# Patient Record
Sex: MALE | Race: WHITE | Employment: OTHER | URBAN - NONMETROPOLITAN AREA
[De-identification: names, ages, dates, MRNs, and addresses within clinical notes are randomized per-mention and may not be internally consistent; named-entity substitution may affect disease eponyms.]

---

## 2017-10-20 DIAGNOSIS — J44.9 CHRONIC OBSTRUCTIVE PULMONARY DISEASE, UNSPECIFIED COPD TYPE (HCC): ICD-10-CM

## 2017-10-23 RX ORDER — TIOTROPIUM BROMIDE 18 UG/1
CAPSULE ORAL; RESPIRATORY (INHALATION)
Qty: 30 CAPSULE | Refills: 11 | Status: SHIPPED | OUTPATIENT
Start: 2017-10-23

## 2019-04-01 ENCOUNTER — HOSPITAL ENCOUNTER (EMERGENCY)
Age: 54
Discharge: HOME | End: 2019-04-01
Payer: MEDICARE

## 2019-04-01 VITALS
RESPIRATION RATE: 20 BRPM | BODY MASS INDEX: 38.92 KG/M2 | TEMPERATURE: 98.8 F | DIASTOLIC BLOOD PRESSURE: 69 MMHG | WEIGHT: 256 LBS | HEART RATE: 92 BPM | SYSTOLIC BLOOD PRESSURE: 142 MMHG | OXYGEN SATURATION: 96 %

## 2019-04-01 DIAGNOSIS — J20.9 ACUTE BRONCHITIS, UNSPECIFIED ORGANISM: Primary | ICD-10-CM

## 2019-04-01 DIAGNOSIS — Z87.09 HISTORY OF COPD: ICD-10-CM

## 2019-04-01 PROCEDURE — 99214 OFFICE O/P EST MOD 30 MIN: CPT | Performed by: NURSE PRACTITIONER

## 2019-04-01 PROCEDURE — 99212 OFFICE O/P EST SF 10 MIN: CPT

## 2019-04-01 RX ORDER — PREDNISONE 20 MG/1
20 TABLET ORAL 2 TIMES DAILY
Qty: 10 TABLET | Refills: 0 | Status: SHIPPED | OUTPATIENT
Start: 2019-04-01 | End: 2019-04-06

## 2019-04-01 RX ORDER — DEXTROMETHORPHAN POLISTIREX 30 MG/5ML
60 SUSPENSION ORAL 2 TIMES DAILY PRN
Refills: 0 | COMMUNITY
Start: 2019-04-01 | End: 2019-04-11

## 2019-04-01 RX ORDER — LORATADINE 10 MG/1
10 TABLET ORAL DAILY PRN
Refills: 0 | COMMUNITY
Start: 2019-04-01

## 2019-04-01 RX ORDER — DOXYCYCLINE HYCLATE 100 MG/1
100 CAPSULE ORAL 2 TIMES DAILY
Qty: 14 CAPSULE | Refills: 0 | Status: SHIPPED | OUTPATIENT
Start: 2019-04-01 | End: 2019-04-08

## 2019-04-01 ASSESSMENT — ENCOUNTER SYMPTOMS
VOMITING: 0
WHEEZING: 1
CHEST TIGHTNESS: 0
STRIDOR: 0
COUGH: 1
FACIAL SWELLING: 0
SINUS PAIN: 0
EYE DISCHARGE: 0
SHORTNESS OF BREATH: 0
SORE THROAT: 0
SINUS PRESSURE: 0
VOICE CHANGE: 0
TROUBLE SWALLOWING: 0
NAUSEA: 0
SINUS CONGESTION: 1
RHINORRHEA: 1

## 2019-04-01 NOTE — ED PROVIDER NOTES
Harshad   Urgent Care Encounter      [address] [facility]       Chief Complaint   Patient presents with    Cough     congestion x 5 days       Nurses Notes reviewed and I agree except as noted in the HPI. HISTORY OF PRESENT ILLNESS   Gopi Joseph III is a 48 y.o. male who presents:  Currently on Spirivia, Albuterol prn, Pulmicort. Taking Flonase states not helping. History of tobacco dependence, bronchitis. He is on a cpap machine. He has not had to use his albuterol this illness. Denies fever, chills, myalgias. The history is provided by the patient. Cough   Cough characteristics:  Productive  Sputum characteristics:  Yellow and clear (normally has sputum daily, producing more sputum)  Severity:  Moderate  Onset quality:  Sudden  Duration: 3-4 days   Timing:  Constant  Progression:  Unchanged  Chronicity:  Chronic  Smoker: yes    Context: not sick contacts    Relieved by:  Nothing  Worsened by:  Nothing  Associated symptoms: rhinorrhea, sinus congestion and wheezing    Associated symptoms: no chest pain, no chills, no diaphoresis, no ear fullness, no ear pain, no eye discharge, no fever, no headaches, no myalgias, no rash, no shortness of breath, no sore throat and no weight loss    Associated symptoms comment:  Using prescribed inhalers, flonase not helping  Wheezing:     Severity:  Mild    Onset quality:  Sudden    Duration:  4 days    Timing:  Intermittent    Progression:  Unchanged    Chronicity:  Chronic  Risk factors: no recent infection and no recent travel        REVIEW OF SYSTEMS     Review of Systems   Constitutional: Negative for activity change, appetite change, chills, diaphoresis, fatigue, fever, unexpected weight change and weight loss. HENT: Positive for congestion, postnasal drip and rhinorrhea. Negative for ear discharge, ear pain, facial swelling, sinus pressure, sinus pain, sneezing, sore throat, tinnitus, trouble swallowing and voice change.     Eyes: Negative for discharge. Respiratory: Positive for cough and wheezing. Negative for chest tightness, shortness of breath and stridor. Cardiovascular: Negative for chest pain. Gastrointestinal: Negative for nausea and vomiting. Musculoskeletal: Negative for myalgias. Skin: Negative for pallor and rash. Neurological: Negative for dizziness and headaches. Hematological: Negative for adenopathy. PAST MEDICAL HISTORY         Diagnosis Date    Abdominal pain     Acute pharyngitis     Anxiety     Webb's esophagus     Chronic laryngitis     Constipation     COPD (chronic obstructive pulmonary disease) (HCC)     Depression     Fatigue     GERD (gastroesophageal reflux disease)     Hemorrhoids, internal, with bleeding     HEP C W/ COMA, ACUTE/NOS     Hyperlipidemia     Hypertension     IBS (irritable bowel syndrome)     Rectal bleeding     Shortness of breath     Weight loss        SURGICAL HISTORY     Patient  has a past surgical history that includes Cholecystectomy; Mandible fracture surgery (1989); Colonoscopy (02/16/2011); Upper gastrointestinal endoscopy (12/14/2009); Cardiac catheterization (Dec 2012); and Upper gastrointestinal endoscopy (9/25/13). CURRENT MEDICATIONS       Previous Medications    ALBUTEROL (PROVENTIL) (2.5 MG/3ML) 0.083% NEBULIZER SOLUTION    Take 3 mLs by nebulization every 6 hours as needed for Wheezing or Shortness of Breath    ALBUTEROL SULFATE  (90 BASE) MCG/ACT INHALER    Inhale 2 puffs into the lungs every 4 hours    ASPIRIN 81 MG TABLET    Take 81 mg by mouth daily.     ATORVASTATIN (LIPITOR) 40 MG TABLET    Take 1 tablet by mouth daily    BUDESONIDE-FORMOTEROL (SYMBICORT) 160-4.5 MCG/ACT AERO    Inhale 2 puffs into the lungs 2 times daily    DEXILANT 60 MG CPDR DELAYED RELEASE CAPSULE    take 1 capsule by mouth once daily    FLUTICASONE (FLONASE) 50 MCG/ACT NASAL SPRAY    1 spray by Nasal route daily    HANDICAP PLACARD MISC    by Does not jaw. No uvula swelling. Posterior oropharyngeal erythema (mild with post nasal drip) present. No oropharyngeal exudate or posterior oropharyngeal edema. Tonsils are 2+ on the right. Tonsils are 2+ on the left. No tonsillar exudate. Neck: Normal range of motion. Neck supple. Cardiovascular: Normal rate, S1 normal, S2 normal and normal heart sounds. Exam reveals no gallop, no S3, no S4, no distant heart sounds and no friction rub. No murmur heard. Pulmonary/Chest: Effort normal. No stridor. No respiratory distress. He has no decreased breath sounds. He has wheezes (expiratory wheezing LUF, LLL, Right mid and RLL). He has no rhonchi. He has no rales. Lymphadenopathy:     He has no cervical adenopathy. Neurological: He is alert and oriented to person, place, and time. Skin: Skin is warm, dry and intact. No rash noted. He is not diaphoretic. No cyanosis. No pallor. Psychiatric: He has a normal mood and affect. Nursing note and vitals reviewed. DIAGNOSTIC RESULTS   Labs:No results found for this visit on 04/01/19. IMAGING:    URGENT CARE COURSE:     Vitals:    04/01/19 1314   BP: (!) 142/69   Pulse: 92   Resp: 20   Temp: 98.8 °F (37.1 °C)   TempSrc: Temporal   SpO2: 96%   Weight: 256 lb (116.1 kg)       Medications - No data to display  PROCEDURES:  None  FINAL IMPRESSION       1. Acute bronchitis, unspecified organism    2. History of COPD        DISPOSITION/PLAN   DISPOSITION Decision To Discharge 04/01/2019 01:29:19 PM  ADDITIONAL INSTRUCTIONS: Rest, no milk, plenty of clear liquids, tylenol/motrin for fever and pain. PATIENT REFERRED TO:  No follow-up provider specified. Patient instructed to follow up with PCP. If symptoms worsen, become severe or new symptoms develop patient instructedto go to the emergency room immediately.     DISCHARGE MEDICATIONS:  New Prescriptions    DEXTROMETHORPHAN (DELSYM) 30 MG/5ML EXTENDED RELEASE LIQUID    Take 10 mLs by mouth 2 times daily as needed for Cough    DOXYCYCLINE HYCLATE (VIBRAMYCIN) 100 MG CAPSULE    Take 1 capsule by mouth 2 times daily for 7 days    LORATADINE (CLARITIN) 10 MG TABLET    Take 1 tablet by mouth daily as needed (runny nose, sneezing, post nasal drip)    PREDNISONE (DELTASONE) 20 MG TABLET    Take 1 tablet by mouth 2 times daily for 5 days     Current Discharge Medication List          Patient giveneducational materials - see patient instructions. Discussed use, benefit, and side effects of prescribed medications. All patient questions answered. Pt voiced understanding. Reviewed health maintenance. Patient agreedwith treatment plan. Follow up as directed.      ZOHREH Jacob CNP, APRN - CNP  04/01/19 [id number]

## 2019-07-08 ENCOUNTER — HOSPITAL ENCOUNTER (OUTPATIENT)
Age: 54
Setting detail: SPECIMEN
Discharge: HOME | End: 2019-07-08
Payer: MEDICARE

## 2019-07-08 LAB
ABSOLUTE EOS #: 0.09 K/UL (ref 0–0.44)
ABSOLUTE IMMATURE GRANULOCYTE: 0.06 K/UL (ref 0–0.3)
ABSOLUTE LYMPH #: 1.45 K/UL (ref 1.1–3.7)
ABSOLUTE MONO #: 0.55 K/UL (ref 0.1–1.2)
ALBUMIN SERPL-MCNC: 4.1 G/DL (ref 3.5–5.2)
ALBUMIN/GLOBULIN RATIO: 1.6 (ref 1–2.5)
ALP BLD-CCNC: 51 U/L (ref 40–129)
ALT SERPL-CCNC: 21 U/L (ref 5–41)
ANION GAP SERPL CALCULATED.3IONS-SCNC: 13 MMOL/L (ref 9–17)
AST SERPL-CCNC: 20 U/L
BASOPHILS # BLD: 0 % (ref 0–2)
BASOPHILS ABSOLUTE: 0.03 K/UL (ref 0–0.2)
BILIRUB SERPL-MCNC: 0.43 MG/DL (ref 0.3–1.2)
BUN BLDV-MCNC: 10 MG/DL (ref 6–20)
BUN/CREAT BLD: ABNORMAL (ref 9–20)
CALCIUM SERPL-MCNC: 8.7 MG/DL (ref 8.6–10.4)
CHLORIDE BLD-SCNC: 105 MMOL/L (ref 98–107)
CHOLESTEROL/HDL RATIO: 4.6
CHOLESTEROL: 189 MG/DL
CO2: 23 MMOL/L (ref 20–31)
CREAT SERPL-MCNC: 0.94 MG/DL (ref 0.7–1.2)
DIFFERENTIAL TYPE: ABNORMAL
EOSINOPHILS RELATIVE PERCENT: 1 % (ref 1–4)
GFR AFRICAN AMERICAN: >60 ML/MIN
GFR NON-AFRICAN AMERICAN: >60 ML/MIN
GFR SERPL CREATININE-BSD FRML MDRD: ABNORMAL ML/MIN/{1.73_M2}
GFR SERPL CREATININE-BSD FRML MDRD: ABNORMAL ML/MIN/{1.73_M2}
GLUCOSE BLD-MCNC: 107 MG/DL (ref 70–99)
HCT VFR BLD CALC: 43.7 % (ref 40.7–50.3)
HDLC SERPL-MCNC: 41 MG/DL
HEMOGLOBIN: 14.1 G/DL (ref 13–17)
IMMATURE GRANULOCYTES: 1 %
LDL CHOLESTEROL: 116 MG/DL (ref 0–130)
LYMPHOCYTES # BLD: 19 % (ref 24–43)
MCH RBC QN AUTO: 33.3 PG (ref 25.2–33.5)
MCHC RBC AUTO-ENTMCNC: 32.3 G/DL (ref 28.4–34.8)
MCV RBC AUTO: 103.3 FL (ref 82.6–102.9)
MONOCYTES # BLD: 7 % (ref 3–12)
NRBC AUTOMATED: 0 PER 100 WBC
PDW BLD-RTO: 13.6 % (ref 11.8–14.4)
PLATELET # BLD: 195 K/UL (ref 138–453)
PLATELET ESTIMATE: ABNORMAL
PMV BLD AUTO: 10.7 FL (ref 8.1–13.5)
POTASSIUM SERPL-SCNC: 4.2 MMOL/L (ref 3.7–5.3)
RBC # BLD: 4.23 M/UL (ref 4.21–5.77)
RBC # BLD: ABNORMAL 10*6/UL
SEG NEUTROPHILS: 72 % (ref 36–65)
SEGMENTED NEUTROPHILS ABSOLUTE COUNT: 5.35 K/UL (ref 1.5–8.1)
SODIUM BLD-SCNC: 141 MMOL/L (ref 135–144)
TOTAL PROTEIN: 6.7 G/DL (ref 6.4–8.3)
TRIGL SERPL-MCNC: 160 MG/DL
TSH SERPL DL<=0.05 MIU/L-ACNC: 2.36 MIU/L (ref 0.3–5)
VLDLC SERPL CALC-MCNC: ABNORMAL MG/DL (ref 1–30)
WBC # BLD: 7.5 K/UL (ref 3.5–11.3)
WBC # BLD: ABNORMAL 10*3/UL

## 2019-07-09 LAB
HAV IGM SER IA-ACNC: NONREACTIVE
HEPATITIS B CORE IGM ANTIBODY: NONREACTIVE
HEPATITIS B SURFACE ANTIGEN: NONREACTIVE
HEPATITIS C ANTIBODY: REACTIVE

## 2019-07-10 LAB
DIRECT EXAM: NORMAL
Lab: NORMAL
SPECIMEN DESCRIPTION: NORMAL

## 2019-08-21 ENCOUNTER — OFFICE VISIT (OUTPATIENT)
Dept: PULMONOLOGY | Age: 54
End: 2019-08-21
Payer: MEDICARE

## 2019-08-21 VITALS
BODY MASS INDEX: 36.97 KG/M2 | HEIGHT: 69 IN | SYSTOLIC BLOOD PRESSURE: 138 MMHG | WEIGHT: 249.6 LBS | OXYGEN SATURATION: 96 % | HEART RATE: 90 BPM | DIASTOLIC BLOOD PRESSURE: 98 MMHG

## 2019-08-21 DIAGNOSIS — E66.9 OBESITY (BMI 30-39.9): ICD-10-CM

## 2019-08-21 DIAGNOSIS — Z87.891 PERSONAL HISTORY OF SMOKING: ICD-10-CM

## 2019-08-21 DIAGNOSIS — G47.33 OSA (OBSTRUCTIVE SLEEP APNEA): Primary | ICD-10-CM

## 2019-08-21 DIAGNOSIS — J44.9 CHRONIC OBSTRUCTIVE PULMONARY DISEASE, UNSPECIFIED COPD TYPE (HCC): ICD-10-CM

## 2019-08-21 DIAGNOSIS — J44.9 CHRONIC BRONCHITIS WITH COPD (CHRONIC OBSTRUCTIVE PULMONARY DISEASE) (HCC): ICD-10-CM

## 2019-08-21 PROCEDURE — G8926 SPIRO NO PERF OR DOC: HCPCS | Performed by: INTERNAL MEDICINE

## 2019-08-21 PROCEDURE — 1036F TOBACCO NON-USER: CPT | Performed by: INTERNAL MEDICINE

## 2019-08-21 PROCEDURE — 99214 OFFICE O/P EST MOD 30 MIN: CPT | Performed by: INTERNAL MEDICINE

## 2019-08-21 PROCEDURE — G8427 DOCREV CUR MEDS BY ELIG CLIN: HCPCS | Performed by: INTERNAL MEDICINE

## 2019-08-21 PROCEDURE — G8417 CALC BMI ABV UP PARAM F/U: HCPCS | Performed by: INTERNAL MEDICINE

## 2019-08-21 PROCEDURE — 3023F SPIROM DOC REV: CPT | Performed by: INTERNAL MEDICINE

## 2019-08-21 PROCEDURE — 3017F COLORECTAL CA SCREEN DOC REV: CPT | Performed by: INTERNAL MEDICINE

## 2019-10-08 ENCOUNTER — HOSPITAL ENCOUNTER (OUTPATIENT)
Dept: SLEEP CENTER | Age: 54
Discharge: HOME | End: 2019-10-10
Payer: MEDICARE

## 2019-10-08 DIAGNOSIS — G47.33 OSA (OBSTRUCTIVE SLEEP APNEA): ICD-10-CM

## 2019-10-08 PROCEDURE — 95811 POLYSOM 6/>YRS CPAP 4/> PARM: CPT

## 2019-10-09 LAB — STATUS: NORMAL

## 2019-10-16 DIAGNOSIS — G47.33 OSA (OBSTRUCTIVE SLEEP APNEA): Primary | ICD-10-CM

## 2019-10-17 ENCOUNTER — TELEPHONE (OUTPATIENT)
Dept: SLEEP CENTER | Age: 54
End: 2019-10-17

## 2019-11-12 ENCOUNTER — TELEPHONE (OUTPATIENT)
Dept: PULMONOLOGY | Age: 54
End: 2019-11-12

## 2019-11-13 ENCOUNTER — HOSPITAL ENCOUNTER (OUTPATIENT)
Dept: PULMONOLOGY | Age: 54
Discharge: HOME | End: 2019-11-13
Payer: MEDICARE

## 2019-11-13 DIAGNOSIS — J44.9 CHRONIC OBSTRUCTIVE PULMONARY DISEASE, UNSPECIFIED COPD TYPE (HCC): ICD-10-CM

## 2019-11-13 PROCEDURE — 94060 EVALUATION OF WHEEZING: CPT

## 2019-11-20 ENCOUNTER — OFFICE VISIT (OUTPATIENT)
Dept: PULMONOLOGY | Age: 54
End: 2019-11-20
Payer: MEDICARE

## 2019-11-20 VITALS
WEIGHT: 245 LBS | DIASTOLIC BLOOD PRESSURE: 72 MMHG | OXYGEN SATURATION: 98 % | BODY MASS INDEX: 36.29 KG/M2 | HEIGHT: 69 IN | SYSTOLIC BLOOD PRESSURE: 130 MMHG | HEART RATE: 91 BPM

## 2019-11-20 DIAGNOSIS — G47.33 OSA (OBSTRUCTIVE SLEEP APNEA): Primary | ICD-10-CM

## 2019-11-20 DIAGNOSIS — E66.9 OBESITY (BMI 30-39.9): ICD-10-CM

## 2019-11-20 DIAGNOSIS — J44.9 ASTHMA-COPD OVERLAP SYNDROME (HCC): ICD-10-CM

## 2019-11-20 DIAGNOSIS — J44.9 CHRONIC OBSTRUCTIVE PULMONARY DISEASE, UNSPECIFIED COPD TYPE (HCC): ICD-10-CM

## 2019-11-20 PROCEDURE — 99213 OFFICE O/P EST LOW 20 MIN: CPT | Performed by: INTERNAL MEDICINE

## 2019-11-20 PROCEDURE — G8417 CALC BMI ABV UP PARAM F/U: HCPCS | Performed by: INTERNAL MEDICINE

## 2019-11-20 PROCEDURE — G8926 SPIRO NO PERF OR DOC: HCPCS | Performed by: INTERNAL MEDICINE

## 2019-11-20 PROCEDURE — 3023F SPIROM DOC REV: CPT | Performed by: INTERNAL MEDICINE

## 2019-11-20 PROCEDURE — G8427 DOCREV CUR MEDS BY ELIG CLIN: HCPCS | Performed by: INTERNAL MEDICINE

## 2019-11-20 PROCEDURE — 1036F TOBACCO NON-USER: CPT | Performed by: INTERNAL MEDICINE

## 2019-11-20 PROCEDURE — 3017F COLORECTAL CA SCREEN DOC REV: CPT | Performed by: INTERNAL MEDICINE

## 2019-11-20 PROCEDURE — G8484 FLU IMMUNIZE NO ADMIN: HCPCS | Performed by: INTERNAL MEDICINE

## 2019-12-03 ENCOUNTER — TELEPHONE (OUTPATIENT)
Dept: PULMONOLOGY | Age: 54
End: 2019-12-03

## 2020-01-15 ENCOUNTER — TELEPHONE (OUTPATIENT)
Dept: PULMONOLOGY | Age: 55
End: 2020-01-15

## 2020-05-20 ENCOUNTER — OFFICE VISIT (OUTPATIENT)
Dept: PULMONOLOGY | Age: 55
End: 2020-05-20
Payer: MEDICARE

## 2020-05-20 VITALS
TEMPERATURE: 98.2 F | OXYGEN SATURATION: 97 % | WEIGHT: 243 LBS | BODY MASS INDEX: 35.99 KG/M2 | HEART RATE: 86 BPM | DIASTOLIC BLOOD PRESSURE: 76 MMHG | SYSTOLIC BLOOD PRESSURE: 138 MMHG | HEIGHT: 69 IN

## 2020-05-20 PROCEDURE — 3017F COLORECTAL CA SCREEN DOC REV: CPT | Performed by: INTERNAL MEDICINE

## 2020-05-20 PROCEDURE — 1036F TOBACCO NON-USER: CPT | Performed by: INTERNAL MEDICINE

## 2020-05-20 PROCEDURE — G8417 CALC BMI ABV UP PARAM F/U: HCPCS | Performed by: INTERNAL MEDICINE

## 2020-05-20 PROCEDURE — G8926 SPIRO NO PERF OR DOC: HCPCS | Performed by: INTERNAL MEDICINE

## 2020-05-20 PROCEDURE — 3023F SPIROM DOC REV: CPT | Performed by: INTERNAL MEDICINE

## 2020-05-20 PROCEDURE — 99214 OFFICE O/P EST MOD 30 MIN: CPT | Performed by: INTERNAL MEDICINE

## 2020-05-20 PROCEDURE — G8427 DOCREV CUR MEDS BY ELIG CLIN: HCPCS | Performed by: INTERNAL MEDICINE

## 2020-05-20 NOTE — PROGRESS NOTES
Subjective:      CC: COPD    Patient ID: Thee Hernandez is a 47 y.o. male. Quit 2 years ago  Riding his motorcycle    PFTs  Asthma-COPD overlap  FEV1 improved, better than 80%  Doing great, no need for refills   Spiriva, Symbicort, Albuterol  Claims using CPAP via [employer] (sent via mail) but no download, tells me he has follow up next week    No cough, wheezing, uses albuterol 2 to 3 times a day    Does not qualify yet for screening CT due to age, it will next year    Previous notes:  Smoking one cigar every now and then, \"but do not inhale\"  No visits to ED  Needs refill for Spiriva  DAVE with minimal activities ---declines rehab rehab    Tells me he uses Symbicort and Ventolin consistently    FEV1 68%    Review of Systems   Constitutional: Negative for fatigue or unexpected weight change. Negative for fever, chills, diaphoresis and activity change. HENT: Negative for congestion, postnasal drip and sinus pressure. Negative for nosebleeds, facial swelling, rhinorrhea, sneezing, mouth sores, neck stiffness and voice change. Eyes: Negative for photophobia and visual disturbance. Respiratory: Positive for no cough, no wheezing . Negative for apnea and stridor. No hemoptysis   Cardiovascular: Positive for leg swelling. Negative for chest pain and palpitations. Gastrointestinal: Negative for vomiting, abdominal pain, constipation, blood in stool and abdominal distention. Genitourinary: Negative for dysuria, frequency, enuresis and difficulty urinating. Musculoskeletal: Negative for back pain, joint swelling and arthralgias. Objective:   Physical Exam   Nursing note and vitals reviewed. Constitutional: He is oriented to person, place, and time. He appears well-developed. No distress. Obese BMI 35  HENT:   Head: Normocephalic and atraumatic.    Right Ear: External ear normal.   Left Ear: External ear normal.   Mouth/Throat: Oropharynx is clear and moist.   Bilateral nasal nasal air pressure transducer, plus  respiratory effort recorded by calibrated respiratory inductance  plethysmography (RIP), flow volume loop, sound and video. Sleep staging  and scoring followed the standard put forth by the American Academy of  Sleep Medicine and utilized the 4A obstructive hypopnea event  desaturation of 4 percent or greater.     DETAILS OF THE STUDY:  There are two portions to this test, diagnostic  portion and therapeutic portion.     DIAGNOSTIC PORTION:  Lights out 09:38 p.m., lights on 12:35 a.m., total  recording time 175 minutes, sleep period time 161 minutes. Total sleep  time 151 minutes. Sleep efficiency 85.3%. Latency to sleep 60 minutes. Wake after sleep onset 10 minutes. Latency to REM 95.5 minutes.     SLEEP STAGING:  The patient slept 11 minutes or 7.3% of total sleep time  in N1 sleep, 110 minutes or 73.2% in N2 sleep, 14.5 minutes or 9.6% in  N3 sleep, 15 minutes or 9.9% in REM sleep.     RESPIRATORY SUMMARY:  80 obstructive apneas, 23 obstructive hypopneas  for an AHI of 40.9. The REM AHI was 32 and the supine AHI was 0.     BODY POSITION SUMMARY:  There were 102 minutes of prone recording, 22  minutes on the left and 26 minutes on the right side.     SLEEP DISTURBANCE SUMMARY:  There were 105 arousals for an arousal index  of 41.7.  98 of these were related to respiratory events with a  respiratory arousal index of 38.9.     PERIODIC LIMB MOVEMENT SUMMARY:  There were none.     PULSE OXIMETRY SUMMARY:  Mean oxygen saturation during wakefulness 94%,  during sleep 92.5%. Lowest oxygen saturation 85%. There were three  minutes of oxygen saturation below 88%.     ECG SUMMARY:  The patient remained in normal sinus rhythm through the  night.     THERAPEUTIC PORTION:  Lights out 12:38 a.m., lights on 05:05 a.m., total  recording time 266 minutes, sleep period time 262 minutes. Total sleep  time 243 minutes, sleep efficiency 91.5%.   Latency to sleep 3.5 minutes,  wake after 35  -Chronic bronchitis      Plan:         -Sleep clinic next week with D/L  -Continue Albuterol MDI prn/Symbicort/Spiriva  -Will qualify for screening CT chest next year  -Needs to lose wt  -Follow up in 6 mos with nino

## 2020-06-03 ENCOUNTER — OFFICE VISIT (OUTPATIENT)
Dept: PULMONOLOGY | Age: 55
End: 2020-06-03
Payer: MEDICARE

## 2020-06-03 ENCOUNTER — TELEPHONE (OUTPATIENT)
Dept: PULMONOLOGY | Age: 55
End: 2020-06-03

## 2020-06-03 VITALS
OXYGEN SATURATION: 99 % | WEIGHT: 246 LBS | DIASTOLIC BLOOD PRESSURE: 80 MMHG | SYSTOLIC BLOOD PRESSURE: 138 MMHG | HEART RATE: 80 BPM | TEMPERATURE: 97.9 F | HEIGHT: 69 IN | BODY MASS INDEX: 36.43 KG/M2

## 2020-06-03 PROCEDURE — 3017F COLORECTAL CA SCREEN DOC REV: CPT | Performed by: PHYSICIAN ASSISTANT

## 2020-06-03 PROCEDURE — 3023F SPIROM DOC REV: CPT | Performed by: PHYSICIAN ASSISTANT

## 2020-06-03 PROCEDURE — 1036F TOBACCO NON-USER: CPT | Performed by: PHYSICIAN ASSISTANT

## 2020-06-03 PROCEDURE — G8417 CALC BMI ABV UP PARAM F/U: HCPCS | Performed by: PHYSICIAN ASSISTANT

## 2020-06-03 PROCEDURE — G8926 SPIRO NO PERF OR DOC: HCPCS | Performed by: PHYSICIAN ASSISTANT

## 2020-06-03 PROCEDURE — 99214 OFFICE O/P EST MOD 30 MIN: CPT | Performed by: PHYSICIAN ASSISTANT

## 2020-06-03 PROCEDURE — G8427 DOCREV CUR MEDS BY ELIG CLIN: HCPCS | Performed by: PHYSICIAN ASSISTANT

## 2020-06-03 ASSESSMENT — ENCOUNTER SYMPTOMS
WHEEZING: 0
NAUSEA: 0
CHEST TIGHTNESS: 0
EYES NEGATIVE: 1
BACK PAIN: 0
COUGH: 0
SHORTNESS OF BREATH: 1
STRIDOR: 0
ALLERGIC/IMMUNOLOGIC NEGATIVE: 1
DIARRHEA: 0

## 2020-06-03 NOTE — PROGRESS NOTES
[facility]      Arcadio Hallman         [id number]  6/3/2020   Chief Complaint   Patient presents with    Follow-up     DARON 6-8wk f/u        Pt of Dr. Sami Arvizu    PAP Download:   Original or initial AHI: 53.9     Date of initial study: 2/27/13      Compliant  33.3%     Noncompliant 66.7 %     PAP Type dream station Level  [id number]   Avg Hrs/Day 3hrs 5 min  AHI: 1.7   Recorded compliance dates ,5/3/20-6/1/20  Machine/Mfg: French  Interface: ffm    Provider:    __[facility]           Tinoco Louis        _x_ Mendel Craw    __ Kristin Carlos            __[facility] __Adaptive   __[place]:       __Other    Neck Size: 19.5  Mallampati Mallampati 4  ESS:  16      Here is a scan of the most recent download:          Presentation:   Sachin Rodriguez presents for sleep medicine follow up for obstructive sleep apnea  Since the last visit, Sachin Rodriguez is struggling with getting use to PAP. He takes it off at times through the night at times. He is still tired at times. Mask is leaking. DME re-fit mask today and gave smaller cushion  Equipment issues: The pressure is  acceptable, the mask is acceptable     Sleep issues:  Do you feel better? No  More rested? No   Better concentration? no    Progress History:   Since last visit any new medical issues? No  New ER or hospitlal visits? No  Any new or changes in medicines? No  Any new sleep medicines?  No        Past Medical History:   Diagnosis Date    Abdominal pain     Acute pharyngitis     Anxiety     Webb's esophagus     Chronic laryngitis     Constipation     COPD (chronic obstructive pulmonary disease) (HCC)     Depression     Fatigue     GERD (gastroesophageal reflux disease)     Hemorrhoids, internal, with bleeding     HEP C W/ COMA, ACUTE/NOS     Hyperlipidemia     Hypertension     IBS (irritable bowel syndrome)     Rectal bleeding     Shortness of breath     Weight loss        Past Surgical History:   Procedure Laterality Date    CARDIAC equal, round, and reactive to light. Neck:      Musculoskeletal: Normal range of motion and neck supple. Cardiovascular:      Rate and Rhythm: Normal rate and regular rhythm. Heart sounds: Normal heart sounds. Pulmonary:      Effort: Pulmonary effort is normal.      Breath sounds: Normal breath sounds. Musculoskeletal: Normal range of motion. Skin:     General: Skin is warm and dry. Neurological:      Mental Status: He is alert and oriented to person, place, and time. Psychiatric:         Behavior: Behavior normal.         Thought Content: Thought content normal.         Judgment: Judgment normal.           ASSESSMENT/DIAGNOSIS     Diagnosis Orders   1. DARON on CPAP     2. Obesity (BMI 30-39.9)     3. Asthma-COPD overlap syndrome ([facility] [address]     4. Hypersomnia              Plan   Do you need any equipment today? No  - Will see if new cushion improves compliance  - Discussed the importance of compliance. - Will adjust ramp time to improve compliance  - He  was advised to continue current positive airway pressure therapy with above described pressure. - He  advised to keep good compliance with current recommended pressure to get optimal results and clinical improvement  - Recommend 7-9 hours of sleep with PAP  - He was advised to call [employer] regarding supplies if needed.   -He call my office for earlier appointment if needed for worsening of sleep symptoms.   - He was instructed on weight loss  - Fátima Pearl was educated about my impression and plan. Patient verbalizesunderstanding.   We will see Juancho Domínguez III back in: 3 months with download    Information added by my medical assistant/LPN was reviewed today          Andrade Mccabe PA-C, MPAS  6/3/2020

## 2020-06-03 NOTE — TELEPHONE ENCOUNTER
Jorge Steven called back he was seen today by trung he received a rx for CPAP machine [facility] but was sent to [facility] medical equipment, his machine is from [employer], please send new rx to [facility].

## 2020-09-09 ENCOUNTER — OFFICE VISIT (OUTPATIENT)
Dept: PULMONOLOGY | Age: 55
End: 2020-09-09
Payer: MEDICARE

## 2020-09-09 ENCOUNTER — TELEPHONE (OUTPATIENT)
Dept: PULMONOLOGY | Age: 55
End: 2020-09-09

## 2020-09-09 VITALS
SYSTOLIC BLOOD PRESSURE: 120 MMHG | RESPIRATION RATE: 20 BRPM | WEIGHT: 240 LBS | TEMPERATURE: 98.3 F | DIASTOLIC BLOOD PRESSURE: 70 MMHG | OXYGEN SATURATION: 97 % | BODY MASS INDEX: 35.55 KG/M2 | HEIGHT: 69 IN | HEART RATE: 84 BPM

## 2020-09-09 PROCEDURE — 3023F SPIROM DOC REV: CPT | Performed by: PHYSICIAN ASSISTANT

## 2020-09-09 PROCEDURE — 1036F TOBACCO NON-USER: CPT | Performed by: PHYSICIAN ASSISTANT

## 2020-09-09 PROCEDURE — 99214 OFFICE O/P EST MOD 30 MIN: CPT | Performed by: PHYSICIAN ASSISTANT

## 2020-09-09 PROCEDURE — 3017F COLORECTAL CA SCREEN DOC REV: CPT | Performed by: PHYSICIAN ASSISTANT

## 2020-09-09 PROCEDURE — G8417 CALC BMI ABV UP PARAM F/U: HCPCS | Performed by: PHYSICIAN ASSISTANT

## 2020-09-09 PROCEDURE — G8926 SPIRO NO PERF OR DOC: HCPCS | Performed by: PHYSICIAN ASSISTANT

## 2020-09-09 PROCEDURE — G8427 DOCREV CUR MEDS BY ELIG CLIN: HCPCS | Performed by: PHYSICIAN ASSISTANT

## 2020-09-09 RX ORDER — ROPINIROLE 0.25 MG/1
TABLET, FILM COATED ORAL
Qty: 60 TABLET | Refills: 3 | Status: SHIPPED | OUTPATIENT
Start: 2020-09-09 | End: 2021-01-05 | Stop reason: ALTCHOICE

## 2020-09-09 ASSESSMENT — ENCOUNTER SYMPTOMS
COUGH: 1
ALLERGIC/IMMUNOLOGIC NEGATIVE: 1
DIARRHEA: 0
WHEEZING: 0
STRIDOR: 0
SHORTNESS OF BREATH: 1
CHEST TIGHTNESS: 0
NAUSEA: 0
BACK PAIN: 0
EYES NEGATIVE: 1

## 2020-09-09 NOTE — PROGRESS NOTES
[facility]      Nael Taveras         [id number]  9/9/2020   Chief Complaint   Patient presents with    Follow-up     DARON 3 mo f/u download        Pt of Dr. Vikram MARCH Download:   Original or initial AHI: 53.9   Date of initial study: 2-27-13      Compliant  63.3%     Noncompliant 36.7 %     PAP Type C PAP Level  11 cmH2O   Avg Hrs/Day 6 hrs 39 min 39 sec  AHI: 0.9   Recorded compliance dates , 8-5-20 to 9-3-20   Machine/Mfg:   [] [employer]Med    [x] Respironics/Dreamstation   Interface:   [] Nasal    [] Nasal pillows   [x] FFM      Provider:      [] [facility]     []Tito     [x] [employer]    [] [address]    [] [place]               [] [facility]      [] [employer]    [] [address]      [] Other    Neck Size: 19.5 in   Mallampati IV  ESS:  13    Here is a scan of the most recent download:        Presentation:   Jt Herrera presents for sleep medicine follow up for obstructive sleep apnea  Since the last visit, Jt Herrera is doing better with PAP. He is still very restless at night,  He feels like he has RLS. He has not been treated for it. He states he tosses and turns a lot at night. He is tired during the day. Equipment issues: The pressure is  acceptable, the mask is acceptable     Sleep issues:  Do you feel better? No  More rested? No  Better concentration? no    Progress History:   Since last visit any new medical issues? No  New ER or hospitlal visits? No  Any new or changes in medicines? No  Any new sleep medicines?  No        Past Medical History:   Diagnosis Date    Abdominal pain     Acute pharyngitis     Anxiety     Webb's esophagus     Chronic laryngitis     Constipation     COPD (chronic obstructive pulmonary disease) (HCC)     Depression     Fatigue     GERD (gastroesophageal reflux disease)     Hemorrhoids, internal, with bleeding     HEP C W/ COMA, ACUTE/NOS     Hyperlipidemia     Hypertension     IBS (irritable bowel syndrome)     Rectal bleeding  Shortness of breath     Weight loss        Past Surgical History:   Procedure Laterality Date    CARDIAC CATHETERIZATION  Dec 2012    CHOLECYSTECTOMY      COLONOSCOPY  2011    MANDIBLE FRACTURE SURGERY  1989    UPPER GASTROINTESTINAL ENDOSCOPY  2009    UPPER GASTROINTESTINAL ENDOSCOPY  13    sanchez's esophagus repeat 3 yr       Social History     Tobacco Use    Smoking status: Former Smoker     Packs/day: 1.00     Years: 30.00     Pack years: 30.00     Types: Cigarettes     Start date: 1979     Last attempt to quit: 2014     Years since quittin.6    Smokeless tobacco: Never Used    Tobacco comment: reports occasional cigarette but not daily   Substance Use Topics    Alcohol use: No     Comment: Quit    Drug use: No       No Known Allergies    Current Outpatient Medications   Medication Sig Dispense Refill    CPAP Machine MISC by Does not apply route Please change ramp to starting pressure 6 and 10 min ramp time 1 each 0    loratadine (CLARITIN) 10 MG tablet Take 1 tablet by mouth daily as needed (runny nose, sneezing, post nasal drip)  0    SPIRIVA HANDIHALER 18 MCG inhalation capsule inhale the contents of one capsule in the handihaler once daily 30 capsule 11    Handicap Placard MISC by Does not apply route Duration 5 years 1 each 0    DEXILANT 60 MG CPDR delayed release capsule take 1 capsule by mouth once daily 30 capsule 5    budesonide-formoterol (SYMBICORT) 160-4.5 MCG/ACT AERO Inhale 2 puffs into the lungs 2 times daily 1 Inhaler 11    albuterol sulfate  (90 BASE) MCG/ACT inhaler Inhale 2 puffs into the lungs every 4 hours 1 Inhaler 11    atorvastatin (LIPITOR) 40 MG tablet Take 1 tablet by mouth daily 30 tablet 5    Handicap Placard MISC by Does not apply route Duration 5 years 1 each 0    nystatin (MYCOSTATIN) 559123 UNIT/ML suspension Take 5 mLs by mouth 4 times daily as needed (thrush) 120 mL 0    fluticasone (FLONASE) 50 MCG/ACT nasal spray 1 spray by Nasal route daily 3 Bottle 11    albuterol (PROVENTIL) (2.5 MG/3ML) 0.083% nebulizer solution Take 3 mLs by nebulization every 6 hours as needed for Wheezing or Shortness of Breath 1 Package 5    Respiratory Therapy Supplies (NEBULIZER COMPRESSOR) KIT by Does not apply route. 1 kit 0    aspirin 81 MG tablet Take 81 mg by mouth daily. No current facility-administered medications for this visit. Family History   Problem Relation Age of Onset    Heart Failure Mother     High Blood Pressure Mother     Heart Disease Mother     Diabetes Mother         had type 1 then it got better    Heart Surgery Father         CABG    Heart Disease Father     Other Brother         Review of Systems -   Review of Systems   Constitutional: Negative for activity change, appetite change, chills and fever. HENT: Negative for congestion and postnasal drip. Eyes: Negative. Respiratory: Positive for cough and shortness of breath. Negative for chest tightness, wheezing and stridor. Cardiovascular: Negative for chest pain and leg swelling. Gastrointestinal: Negative for diarrhea and nausea. Endocrine: Negative. Genitourinary: Negative. Musculoskeletal: Negative. Negative for arthralgias and back pain. Skin: Negative. Allergic/Immunologic: Negative. Neurological: Negative. Negative for dizziness and light-headedness. Psychiatric/Behavioral: Negative. All other systems reviewed and are negative. Physical Exam:    BMI:  Body mass index is 35.44 kg/m².     Wt Readings from Last 3 Encounters:   09/09/20 240 lb (108.9 kg)   06/03/20 246 lb (111.6 kg)   05/20/20 243 lb (110.2 kg)     Weight stable / unchanged  Vitals: /70 (Site: Left Upper Arm, Position: Sitting, Cuff Size: Medium Adult)   Pulse 84   Temp 98.3 °F (36.8 °C) Comment: Temporal  Resp 20   Ht 5' 9\" (1.753 m)   Wt 240 lb (108.9 kg)   SpO2 97% Comment: on RA  BMI 35.44 kg/m²       Physical Exam  Constitutional:       Appearance: He is well-developed. HENT:      Head: Normocephalic and atraumatic. Right Ear: External ear normal.      Left Ear: External ear normal.   Eyes:      Conjunctiva/sclera: Conjunctivae normal.      Pupils: Pupils are equal, round, and reactive to light. Neck:      Musculoskeletal: Normal range of motion and neck supple. Cardiovascular:      Rate and Rhythm: Normal rate and regular rhythm. Heart sounds: Normal heart sounds. Pulmonary:      Effort: Pulmonary effort is normal.      Breath sounds: Normal breath sounds. Musculoskeletal: Normal range of motion. Skin:     General: Skin is warm and dry. Neurological:      Mental Status: He is alert and oriented to person, place, and time. Psychiatric:         Behavior: Behavior normal.         Thought Content: Thought content normal.         Judgment: Judgment normal.           ASSESSMENT/DIAGNOSIS     Diagnosis Orders   1. DARON on CPAP     2. Obesity (BMI 30-39.9)     3. Asthma-COPD overlap syndrome ([address]     4. Hypersomnia     5. RLS (restless legs syndrome)              Plan   Do you need any equipment today? No  - Will try treating for RLS to see if improves sleep quality and hypersomnia  - He  was advised to continue current positive airway pressure therapy with above described pressure. - He  advised to keep good compliance with current recommended pressure to get optimal results and clinical improvement  - Recommend 7-9 hours of sleep with PAP  - He was advised to call [employer] regarding supplies if needed.   -He call my office for earlier appointment if needed for worsening of sleep symptoms.   - He was instructed on weight loss  - Clif Patiño was educated about my impression and plan. Patient verbalizesunderstanding.   We will see Collin Benjamin III back in: 3 months with download    Information added by my medical assistant/LPN was reviewed today          Shun Henry PA-C, CATALINOS  9/9/2020

## 2021-01-05 ENCOUNTER — OFFICE VISIT (OUTPATIENT)
Dept: PULMONOLOGY | Age: 56
End: 2021-01-05
Payer: MEDICARE

## 2021-01-05 VITALS
DIASTOLIC BLOOD PRESSURE: 82 MMHG | WEIGHT: 245 LBS | TEMPERATURE: 98.2 F | BODY MASS INDEX: 36.29 KG/M2 | SYSTOLIC BLOOD PRESSURE: 130 MMHG | HEART RATE: 82 BPM | OXYGEN SATURATION: 98 % | HEIGHT: 69 IN

## 2021-01-05 DIAGNOSIS — Z99.89 OSA ON CPAP: Primary | ICD-10-CM

## 2021-01-05 DIAGNOSIS — G25.81 RLS (RESTLESS LEGS SYNDROME): ICD-10-CM

## 2021-01-05 DIAGNOSIS — E66.9 OBESITY (BMI 30-39.9): ICD-10-CM

## 2021-01-05 DIAGNOSIS — G47.33 OSA ON CPAP: Primary | ICD-10-CM

## 2021-01-05 DIAGNOSIS — G47.10 HYPERSOMNIA: ICD-10-CM

## 2021-01-05 PROCEDURE — 1036F TOBACCO NON-USER: CPT | Performed by: PHYSICIAN ASSISTANT

## 2021-01-05 PROCEDURE — 99214 OFFICE O/P EST MOD 30 MIN: CPT | Performed by: PHYSICIAN ASSISTANT

## 2021-01-05 PROCEDURE — G8484 FLU IMMUNIZE NO ADMIN: HCPCS | Performed by: PHYSICIAN ASSISTANT

## 2021-01-05 PROCEDURE — G8427 DOCREV CUR MEDS BY ELIG CLIN: HCPCS | Performed by: PHYSICIAN ASSISTANT

## 2021-01-05 PROCEDURE — G8417 CALC BMI ABV UP PARAM F/U: HCPCS | Performed by: PHYSICIAN ASSISTANT

## 2021-01-05 PROCEDURE — 3017F COLORECTAL CA SCREEN DOC REV: CPT | Performed by: PHYSICIAN ASSISTANT

## 2021-01-05 RX ORDER — GABAPENTIN 100 MG/1
100 CAPSULE ORAL NIGHTLY
Qty: 30 CAPSULE | Refills: 5 | Status: SHIPPED | OUTPATIENT
Start: 2021-01-05 | End: 2021-04-06 | Stop reason: SDUPTHER

## 2021-01-05 ASSESSMENT — ENCOUNTER SYMPTOMS
SHORTNESS OF BREATH: 0
ALLERGIC/IMMUNOLOGIC NEGATIVE: 1
CHEST TIGHTNESS: 0
WHEEZING: 0
COUGH: 0
EYES NEGATIVE: 1
BACK PAIN: 0
STRIDOR: 0
DIARRHEA: 0
NAUSEA: 0

## 2021-01-05 NOTE — PROGRESS NOTES
 Shortness of breath     Weight loss        Past Surgical History:   Procedure Laterality Date    CARDIAC CATHETERIZATION  Dec 2012    CHOLECYSTECTOMY      COLONOSCOPY  2011    MANDIBLE FRACTURE SURGERY  1989    UPPER GASTROINTESTINAL ENDOSCOPY  2009    UPPER GASTROINTESTINAL ENDOSCOPY  13    sanchez's esophagus repeat 3 yr       Social History     Tobacco Use    Smoking status: Former Smoker     Packs/day: 1.00     Years: 30.00     Pack years: 30.00     Types: Cigarettes     Start date: 1979     Quit date: 2014     Years since quittin.0    Smokeless tobacco: Never Used    Tobacco comment: reports occasional cigarette but not daily   Substance Use Topics    Alcohol use: No     Comment: Quit    Drug use: No       No Known Allergies    Current Outpatient Medications   Medication Sig Dispense Refill    CPAP Machine MISC by Does not apply route Please change ramp to starting pressure 6 and 10 min ramp time 1 each 0    loratadine (CLARITIN) 10 MG tablet Take 1 tablet by mouth daily as needed (runny nose, sneezing, post nasal drip)  0    SPIRIVA HANDIHALER 18 MCG inhalation capsule inhale the contents of one capsule in the handihaler once daily 30 capsule 11    Handicap Placard MISC by Does not apply route Duration 5 years 1 each 0    DEXILANT 60 MG CPDR delayed release capsule take 1 capsule by mouth once daily 30 capsule 5    budesonide-formoterol (SYMBICORT) 160-4.5 MCG/ACT AERO Inhale 2 puffs into the lungs 2 times daily 1 Inhaler 11    albuterol sulfate  (90 BASE) MCG/ACT inhaler Inhale 2 puffs into the lungs every 4 hours 1 Inhaler 11    atorvastatin (LIPITOR) 40 MG tablet Take 1 tablet by mouth daily 30 tablet 5    Handicap Placard MISC by Does not apply route Duration 5 years 1 each 0    nystatin (MYCOSTATIN) 842330 UNIT/ML suspension Take 5 mLs by mouth 4 times daily as needed (thrush) 120 mL 0    fluticasone (FLONASE) 50 MCG/ACT nasal spray 1 spray by Nasal route daily 3 Bottle 11    albuterol (PROVENTIL) (2.5 MG/3ML) 0.083% nebulizer solution Take 3 mLs by nebulization every 6 hours as needed for Wheezing or Shortness of Breath 1 Package 5    Respiratory Therapy Supplies (NEBULIZER COMPRESSOR) KIT by Does not apply route. 1 kit 0    aspirin 81 MG tablet Take 81 mg by mouth daily. No current facility-administered medications for this visit. Family History   Problem Relation Age of Onset    Heart Failure Mother     High Blood Pressure Mother     Heart Disease Mother     Diabetes Mother         had type 1 then it got better    Heart Surgery Father         CABG    Heart Disease Father     Other Brother         Review of Systems -   Review of Systems   Constitutional: Negative for activity change, appetite change, chills and fever. HENT: Negative for congestion and postnasal drip. Eyes: Negative. Respiratory: Negative for cough, chest tightness, shortness of breath, wheezing and stridor. Cardiovascular: Negative for chest pain and leg swelling. Gastrointestinal: Negative for diarrhea and nausea. Endocrine: Negative. Genitourinary: Negative. Musculoskeletal: Negative. Negative for arthralgias and back pain. Skin: Negative. Allergic/Immunologic: Negative. Neurological: Negative for dizziness and light-headedness. Neuropathy    Psychiatric/Behavioral: Negative. All other systems reviewed and are negative. Physical Exam:    BMI:  Body mass index is 36.18 kg/m².     Wt Readings from Last 3 Encounters:   01/05/21 245 lb (111.1 kg)   09/09/20 240 lb (108.9 kg)   06/03/20 246 lb (111.6 kg)     Weight stable / unchanged  Vitals: /82 (Site: Left Upper Arm, Position: Sitting, Cuff Size: Medium Adult)   Pulse 82   Temp 98.2 °F (36.8 °C)   Ht 5' 9\" (1.753 m)   Wt 245 lb (111.1 kg)   SpO2 98% Comment: on room air  BMI 36.18 kg/m²       Physical Exam  Constitutional:       Appearance: He is well-developed. HENT:      Head: Normocephalic and atraumatic. Right Ear: External ear normal.      Left Ear: External ear normal.   Eyes:      Conjunctiva/sclera: Conjunctivae normal.      Pupils: Pupils are equal, round, and reactive to light. Neck:      Musculoskeletal: Normal range of motion and neck supple. Cardiovascular:      Rate and Rhythm: Normal rate and regular rhythm. Heart sounds: Normal heart sounds. Pulmonary:      Effort: Pulmonary effort is normal.      Breath sounds: Normal breath sounds. Musculoskeletal: Normal range of motion. Skin:     General: Skin is warm and dry. Neurological:      Mental Status: He is alert and oriented to person, place, and time. Psychiatric:         Behavior: Behavior normal.         Thought Content: Thought content normal.         Judgment: Judgment normal.           ASSESSMENT/DIAGNOSIS     Diagnosis Orders   1. DARON on CPAP     2. Obesity (BMI 30-39.9)     3. Hypersomnia     4. RLS (restless legs syndrome)              Plan   Do you need any equipment today? No  - Will stop Requip and try gabapentin. - Download reviewed and discussed with patient  - He  was advised to continue current positive airway pressure therapy with above described pressure. - He  advised to keep good compliance with current recommended pressure to get optimal results and clinical improvement  - Recommend 7-9 hours of sleep with PAP  - He was advised to call [employer] regarding supplies if needed.   -He call my office for earlier appointment if needed for worsening of sleep symptoms.   - He was instructed on weight loss  - Madelaine Vale was educated about my impression and plan. Patient verbalizesunderstanding.   We will see Gold Escobedo III back in: 3 months with download    Information added by my medical assistant/LPN was reviewed today         Nitin Adams PA-C, MPAS  1/5/2021       Total time for visit was 30 min

## 2021-01-08 RX ORDER — ROPINIROLE 0.25 MG/1
TABLET, FILM COATED ORAL
Qty: 60 TABLET | Refills: 3 | Status: SHIPPED | OUTPATIENT
Start: 2021-01-08 | End: 2021-04-06

## 2021-04-06 ENCOUNTER — OFFICE VISIT (OUTPATIENT)
Dept: PULMONOLOGY | Age: 56
End: 2021-04-06
Payer: MEDICARE

## 2021-04-06 VITALS
HEIGHT: 69 IN | SYSTOLIC BLOOD PRESSURE: 134 MMHG | OXYGEN SATURATION: 97 % | WEIGHT: 241 LBS | HEART RATE: 87 BPM | TEMPERATURE: 98.6 F | DIASTOLIC BLOOD PRESSURE: 86 MMHG | BODY MASS INDEX: 35.7 KG/M2

## 2021-04-06 DIAGNOSIS — E66.9 OBESITY (BMI 30-39.9): ICD-10-CM

## 2021-04-06 DIAGNOSIS — G47.33 OSA ON CPAP: Primary | ICD-10-CM

## 2021-04-06 DIAGNOSIS — G25.81 RLS (RESTLESS LEGS SYNDROME): ICD-10-CM

## 2021-04-06 DIAGNOSIS — Z99.89 OSA ON CPAP: Primary | ICD-10-CM

## 2021-04-06 DIAGNOSIS — G47.10 HYPERSOMNIA: ICD-10-CM

## 2021-04-06 PROCEDURE — 99214 OFFICE O/P EST MOD 30 MIN: CPT | Performed by: PHYSICIAN ASSISTANT

## 2021-04-06 PROCEDURE — 3017F COLORECTAL CA SCREEN DOC REV: CPT | Performed by: PHYSICIAN ASSISTANT

## 2021-04-06 PROCEDURE — 1036F TOBACCO NON-USER: CPT | Performed by: PHYSICIAN ASSISTANT

## 2021-04-06 PROCEDURE — G8427 DOCREV CUR MEDS BY ELIG CLIN: HCPCS | Performed by: PHYSICIAN ASSISTANT

## 2021-04-06 PROCEDURE — G8417 CALC BMI ABV UP PARAM F/U: HCPCS | Performed by: PHYSICIAN ASSISTANT

## 2021-04-06 RX ORDER — GABAPENTIN 300 MG/1
300 CAPSULE ORAL NIGHTLY
Qty: 30 CAPSULE | Refills: 2 | Status: SHIPPED | OUTPATIENT
Start: 2021-04-06 | End: 2021-06-10 | Stop reason: SDUPTHER

## 2021-04-06 ASSESSMENT — ENCOUNTER SYMPTOMS
EYES NEGATIVE: 1
CHEST TIGHTNESS: 0
NAUSEA: 0
WHEEZING: 0
COUGH: 0
BACK PAIN: 0
ALLERGIC/IMMUNOLOGIC NEGATIVE: 1
STRIDOR: 0
SHORTNESS OF BREATH: 0
DIARRHEA: 0

## 2021-04-06 NOTE — PROGRESS NOTES
pain and leg swelling. Gastrointestinal: Negative for diarrhea and nausea. Endocrine: Negative. Genitourinary: Negative. Musculoskeletal: Negative. Negative for arthralgias and back pain. Skin: Negative. Allergic/Immunologic: Negative. Neurological: Negative. Negative for dizziness and light-headedness. Psychiatric/Behavioral: Negative. All other systems reviewed and are negative. Physical Exam:    BMI:  Body mass index is 35.59 kg/m². Wt Readings from Last 3 Encounters:   04/06/21 241 lb (109.3 kg)   01/05/21 245 lb (111.1 kg)   09/09/20 240 lb (108.9 kg)     Weight stable / unchanged  Vitals: /86 (Site: Right Upper Arm, Position: Sitting, Cuff Size: Large Adult)   Pulse 87   Temp 98.6 °F (37 °C)   Ht 5' 9\" (1.753 m)   Wt 241 lb (109.3 kg)   SpO2 97% Comment: on room air at rest  BMI 35.59 kg/m²       Physical Exam  Constitutional:       Appearance: He is well-developed. HENT:      Head: Normocephalic and atraumatic. Right Ear: External ear normal.      Left Ear: External ear normal.   Eyes:      Conjunctiva/sclera: Conjunctivae normal.      Pupils: Pupils are equal, round, and reactive to light. Neck:      Musculoskeletal: Normal range of motion and neck supple. Cardiovascular:      Rate and Rhythm: Normal rate and regular rhythm. Heart sounds: Normal heart sounds. Pulmonary:      Effort: Pulmonary effort is normal.      Breath sounds: Normal breath sounds. Musculoskeletal: Normal range of motion. Skin:     General: Skin is warm and dry. Neurological:      Mental Status: He is alert and oriented to person, place, and time. Psychiatric:         Behavior: Behavior normal.         Thought Content: Thought content normal.         Judgment: Judgment normal.           ASSESSMENT/DIAGNOSIS     Diagnosis Orders   1. DARON on CPAP     2. Obesity (BMI 30-39.9)     3. Hypersomnia     4.  RLS (restless legs syndrome)              Plan   Do you need any equipment today? No  - He describes more neuropathy discomfort than RLS  - Will increase gabapentin and refer to PCP  - Download reviewed and discussed with patient  - He  was advised to continue current positive airway pressure therapy with above described pressure. - He  advised to keep good compliance with current recommended pressure to get optimal results and clinical improvement  - Recommend 7-9 hours of sleep with PAP  - He was advised to call [employer] regarding supplies if needed.   -He call my office for earlier appointment if needed for worsening of sleep symptoms.   - He was instructed on weight loss  - CASEY was educated about my impression and plan. Patient verbalizesunderstanding.   We will see Pedro Ammy DE LEÓN back in: 2 months with download    Information added by my medical assistant/LPN was reviewed today       Prudence Cough NICOLE, DANYEL  4/6/2021

## 2021-06-09 NOTE — PROGRESS NOTES
[facility]      Willie Gan         [id number]  6/10/2021   Chief Complaint   Patient presents with    Follow-up     DARON 2 month sleep follow up with download         Pt of Dr. Levora Aase    PAP Download:   Original or initial AHI: 53.9     Date of initial study: 5/27/2013      Compliant  66.7%     Noncompliant n/a %     PAP Type Cpap ProLevel  11   Avg Hrs/Day 6 hr 38 min 53 sec  AHI: 2.1   Recorded compliance dates , 5/10/2021  to 6/8/2021   Machine/Mfg:   [] ResMed    [x] Respironics/Dreamstation   Interface:   [] Nasal    [] Nasal pillows   [x] FFM      Provider:      [] SR-HME     [][employer]     [x] [employer]    [] Normal    [] [employer]               [] [employer]      [] Adaptive    [] [address]      [] Other    Neck Size: 19.5  Mallampati Mallampati 4  ESS:  13  SAQLI: 53    Here is a scan of the most recent download:          Presentation:   Radha Ramesh presents for sleep medicine follow up for obstructive sleep apnea, restless leg syndrome  Since the last visit, Radha Ramesh is doing well with PAP. Gabapentin was increased at last visit. He feels some benefit. His mask is leaking. He is taking Requip with benefit also. Equipment issues: The pressure is  acceptable, the mask is acceptable     Sleep issues:  Do you feel better? Yes and No  More rested? Sometimes   Better concentration? yes    Progress History:   Since last visit any new medical issues? No  New ER or hospital visits? No  Any new or changes in medicines? No  Any new sleep medicines? No    Review of Systems -   Review of Systems   Constitutional: Negative for activity change, appetite change, chills and fever. HENT: Negative for congestion and postnasal drip. Eyes: Negative. Respiratory: Negative for cough, chest tightness, shortness of breath, wheezing and stridor. Cardiovascular: Negative for chest pain and leg swelling. Gastrointestinal: Negative for diarrhea and nausea.    Endocrine: Negative. Genitourinary: Negative. Musculoskeletal: Negative. Negative for arthralgias and back pain. Skin: Negative. Allergic/Immunologic: Negative. Neurological: Negative. Negative for dizziness and light-headedness. Psychiatric/Behavioral: Negative. All other systems reviewed and are negative. Physical Exam:    BMI:  Body mass index is 35.86 kg/m². Wt Readings from Last 3 Encounters:   06/10/21 242 lb 12.8 oz (110.1 kg)   04/06/21 241 lb (109.3 kg)   01/05/21 245 lb (111.1 kg)     Weight stable / unchanged  Vitals: /68 (Site: Right Upper Arm, Position: Sitting, Cuff Size: Large Adult)   Pulse 86   Temp 97.8 °F (36.6 °C) (Temporal)   Ht 5' 9\" (1.753 m)   Wt 242 lb 12.8 oz (110.1 kg)   SpO2 98% Comment: Room air at rest  BMI 35.86 kg/m²       Physical Exam  Constitutional:       Appearance: He is well-developed. HENT:      Head: Normocephalic and atraumatic. Right Ear: External ear normal.      Left Ear: External ear normal.   Eyes:      Conjunctiva/sclera: Conjunctivae normal.      Pupils: Pupils are equal, round, and reactive to light. Cardiovascular:      Rate and Rhythm: Normal rate and regular rhythm. Heart sounds: Normal heart sounds. Pulmonary:      Effort: Pulmonary effort is normal.      Breath sounds: Normal breath sounds. Musculoskeletal:         General: Normal range of motion. Cervical back: Normal range of motion and neck supple. Skin:     General: Skin is warm and dry. Neurological:      Mental Status: He is alert and oriented to person, place, and time. Psychiatric:         Behavior: Behavior normal.         Thought Content: Thought content normal.         Judgment: Judgment normal.           ASSESSMENT/DIAGNOSIS     Diagnosis Orders   1. DARON on CPAP     2. Obesity (BMI 30-39.9)     3. Hypersomnia     4. RLS (restless legs syndrome)              Plan   Do you need any equipment today?  No  - Download reviewed and discussed with patient  - He  was advised to continue current positive airway pressure therapy with above described pressure. - He  advised to keep good compliance with current recommended pressure to get optimal results and clinical improvement  - Recommend 7-9 hours of sleep with PAP  - He was advised to call [employer] regarding supplies if needed.   -He call my office for earlier appointment if needed for worsening of sleep symptoms.   - He was instructed on weight loss  - Anson Omar was educated about my impression and plan. Patient verbalizesunderstanding.   We will see Digna Billings III back in: 6 months with download    Information added by my medical assistant/LPN was reviewed today       Yung Cordova PA-C, MPAS  6/10/2021

## 2021-06-10 ENCOUNTER — OFFICE VISIT (OUTPATIENT)
Dept: PULMONOLOGY | Age: 56
End: 2021-06-10
Payer: MEDICARE

## 2021-06-10 VITALS
OXYGEN SATURATION: 98 % | SYSTOLIC BLOOD PRESSURE: 132 MMHG | TEMPERATURE: 97.8 F | BODY MASS INDEX: 35.96 KG/M2 | HEIGHT: 69 IN | DIASTOLIC BLOOD PRESSURE: 68 MMHG | WEIGHT: 242.8 LBS | HEART RATE: 86 BPM

## 2021-06-10 DIAGNOSIS — Z99.89 OSA ON CPAP: Primary | ICD-10-CM

## 2021-06-10 DIAGNOSIS — G47.33 OSA ON CPAP: Primary | ICD-10-CM

## 2021-06-10 DIAGNOSIS — E66.9 OBESITY (BMI 30-39.9): ICD-10-CM

## 2021-06-10 DIAGNOSIS — G47.10 HYPERSOMNIA: ICD-10-CM

## 2021-06-10 DIAGNOSIS — G25.81 RLS (RESTLESS LEGS SYNDROME): ICD-10-CM

## 2021-06-10 PROCEDURE — G8427 DOCREV CUR MEDS BY ELIG CLIN: HCPCS | Performed by: PHYSICIAN ASSISTANT

## 2021-06-10 PROCEDURE — 1036F TOBACCO NON-USER: CPT | Performed by: PHYSICIAN ASSISTANT

## 2021-06-10 PROCEDURE — G8417 CALC BMI ABV UP PARAM F/U: HCPCS | Performed by: PHYSICIAN ASSISTANT

## 2021-06-10 PROCEDURE — 99213 OFFICE O/P EST LOW 20 MIN: CPT | Performed by: PHYSICIAN ASSISTANT

## 2021-06-10 PROCEDURE — 3017F COLORECTAL CA SCREEN DOC REV: CPT | Performed by: PHYSICIAN ASSISTANT

## 2021-06-10 RX ORDER — ROPINIROLE 0.25 MG/1
TABLET, FILM COATED ORAL
Qty: 60 TABLET | Refills: 5 | Status: SHIPPED | OUTPATIENT
Start: 2021-06-10 | End: 2022-02-01 | Stop reason: SDUPTHER

## 2021-06-10 RX ORDER — GABAPENTIN 300 MG/1
300 CAPSULE ORAL NIGHTLY
Qty: 30 CAPSULE | Refills: 5 | Status: SHIPPED | OUTPATIENT
Start: 2021-06-10 | End: 2021-09-08

## 2021-06-10 ASSESSMENT — ENCOUNTER SYMPTOMS
EYES NEGATIVE: 1
BACK PAIN: 0
STRIDOR: 0
COUGH: 0
ALLERGIC/IMMUNOLOGIC NEGATIVE: 1
NAUSEA: 0
WHEEZING: 0
DIARRHEA: 0
SHORTNESS OF BREATH: 0
CHEST TIGHTNESS: 0

## 2021-08-31 RX ORDER — GABAPENTIN 100 MG/1
CAPSULE ORAL
Qty: 30 CAPSULE | Refills: 3 | Status: SHIPPED | OUTPATIENT
Start: 2021-08-31 | End: 2022-01-17

## 2022-01-17 RX ORDER — GABAPENTIN 100 MG/1
CAPSULE ORAL
Qty: 30 CAPSULE | Refills: 3 | Status: SHIPPED | OUTPATIENT
Start: 2022-01-17 | End: 2022-07-12

## 2022-02-01 ENCOUNTER — OFFICE VISIT (OUTPATIENT)
Dept: PULMONOLOGY | Age: 57
End: 2022-02-01
Payer: MEDICARE

## 2022-02-01 VITALS
TEMPERATURE: 98 F | SYSTOLIC BLOOD PRESSURE: 130 MMHG | BODY MASS INDEX: 35.01 KG/M2 | WEIGHT: 236.4 LBS | DIASTOLIC BLOOD PRESSURE: 70 MMHG | HEART RATE: 91 BPM | HEIGHT: 69 IN | OXYGEN SATURATION: 98 %

## 2022-02-01 DIAGNOSIS — J44.9 CHRONIC OBSTRUCTIVE PULMONARY DISEASE, UNSPECIFIED COPD TYPE (HCC): ICD-10-CM

## 2022-02-01 DIAGNOSIS — G47.33 OSA ON CPAP: Primary | ICD-10-CM

## 2022-02-01 DIAGNOSIS — J44.1 COPD EXACERBATION (HCC): ICD-10-CM

## 2022-02-01 DIAGNOSIS — E66.9 OBESITY (BMI 30-39.9): ICD-10-CM

## 2022-02-01 DIAGNOSIS — Z99.89 OSA ON CPAP: Primary | ICD-10-CM

## 2022-02-01 DIAGNOSIS — G47.10 HYPERSOMNIA: ICD-10-CM

## 2022-02-01 DIAGNOSIS — G25.81 RLS (RESTLESS LEGS SYNDROME): ICD-10-CM

## 2022-02-01 PROCEDURE — G8484 FLU IMMUNIZE NO ADMIN: HCPCS | Performed by: PHYSICIAN ASSISTANT

## 2022-02-01 PROCEDURE — G8427 DOCREV CUR MEDS BY ELIG CLIN: HCPCS | Performed by: PHYSICIAN ASSISTANT

## 2022-02-01 PROCEDURE — 1036F TOBACCO NON-USER: CPT | Performed by: PHYSICIAN ASSISTANT

## 2022-02-01 PROCEDURE — 3023F SPIROM DOC REV: CPT | Performed by: PHYSICIAN ASSISTANT

## 2022-02-01 PROCEDURE — 3017F COLORECTAL CA SCREEN DOC REV: CPT | Performed by: PHYSICIAN ASSISTANT

## 2022-02-01 PROCEDURE — G8417 CALC BMI ABV UP PARAM F/U: HCPCS | Performed by: PHYSICIAN ASSISTANT

## 2022-02-01 PROCEDURE — 99213 OFFICE O/P EST LOW 20 MIN: CPT | Performed by: PHYSICIAN ASSISTANT

## 2022-02-01 RX ORDER — ALBUTEROL SULFATE 90 UG/1
2 AEROSOL, METERED RESPIRATORY (INHALATION) EVERY 4 HOURS
Qty: 1 EACH | Refills: 5 | Status: SHIPPED | OUTPATIENT
Start: 2022-02-01 | End: 2022-07-12

## 2022-02-01 RX ORDER — ROPINIROLE 0.25 MG/1
TABLET, FILM COATED ORAL
Qty: 60 TABLET | Refills: 5 | Status: SHIPPED | OUTPATIENT
Start: 2022-02-01 | End: 2022-10-11

## 2022-02-01 ASSESSMENT — ENCOUNTER SYMPTOMS
NAUSEA: 0
WHEEZING: 0
DIARRHEA: 0
STRIDOR: 0
CHEST TIGHTNESS: 0
BACK PAIN: 0
EYES NEGATIVE: 1
ALLERGIC/IMMUNOLOGIC NEGATIVE: 1
COUGH: 0
SHORTNESS OF BREATH: 0

## 2022-02-01 NOTE — PROGRESS NOTES
[facility]      Gagan Pradhan         [id number]  2/1/2022   Chief Complaint   Patient presents with    6 Month Follow-Up     DARON with download , gabapentin, requip        Pt of Dr. Ramos MARCH Download:   Original or initial AHI: 53.9     Date of initial study: 5/21/2013      Compliant  93.3%     Noncompliant 6.7 %     PAP Type CPAP Vauto Level  11   Avg Hrs/Day 7 hr 54 min  AHI: 4.1  Recorded compliance dates , 1/1/2022  to 1/30/2022   Machine/Mfg:   [] ResMed    [x] Respironics/Dreamstation   Interface:   [] Nasal    [] Nasal pillows   [x] FFM      Provider:      [] SR-HME     []Tito     [x] Laila    [] Alex Beasley    [] [place]               [] [employer]      [] Adaptive    [] [address]      [] Other    Neck Size: 19.5  Mallampati Mallampati 4  ESS:    SAQLI: 45  Here is a scan of the most recent download:            Presentation:   Lashonda Ramirez presents for sleep medicine follow up for obstructive sleep apnea  Since the last visit, Lashonda Ramirez is doing well with PAP. Requip has been helpful for RLS. He is also on gabapentin. He has some foot pain. Mask fits ok. Equipment issues: The pressure is  acceptable, the mask is acceptable     Sleep issues:  Do you feel better? Yes  More rested? Yes   Better concentration? yes    Progress History:   Since last visit any new medical issues? No  New ER or hospital visits? No  Any new or changes in medicines? No  Any new sleep medicines? No    Review of Systems -   Review of Systems   Constitutional: Negative for activity change, appetite change, chills and fever. HENT: Negative for congestion and postnasal drip. Eyes: Negative. Respiratory: Negative for cough, chest tightness, shortness of breath, wheezing and stridor. Cardiovascular: Negative for chest pain and leg swelling. Gastrointestinal: Negative for diarrhea and nausea. Endocrine: Negative. Genitourinary: Negative. Musculoskeletal: Negative. Negative for arthralgias and back pain. Skin: Negative. Allergic/Immunologic: Negative. Neurological: Negative. Negative for dizziness and light-headedness. Psychiatric/Behavioral: Negative. All other systems reviewed and are negative. Physical Exam:    BMI:  Body mass index is 34.91 kg/m². Wt Readings from Last 3 Encounters:   02/01/22 236 lb 6.4 oz (107.2 kg)   06/10/21 242 lb 12.8 oz (110.1 kg)   04/06/21 241 lb (109.3 kg)     Weight stable / unchanged  Vitals: /70 (Site: Left Upper Arm, Position: Sitting, Cuff Size: Large Adult)   Pulse 91   Temp 98 °F (36.7 °C) (Temporal)   Ht 5' 9\" (1.753 m)   Wt 236 lb 6.4 oz (107.2 kg)   SpO2 98%   BMI 34.91 kg/m²       Physical Exam  Constitutional:       Appearance: He is well-developed. HENT:      Head: Normocephalic and atraumatic. Right Ear: External ear normal.      Left Ear: External ear normal.   Eyes:      Conjunctiva/sclera: Conjunctivae normal.      Pupils: Pupils are equal, round, and reactive to light. Cardiovascular:      Rate and Rhythm: Normal rate and regular rhythm. Heart sounds: Normal heart sounds. Pulmonary:      Effort: Pulmonary effort is normal.      Breath sounds: Normal breath sounds. Musculoskeletal:         General: Normal range of motion. Cervical back: Normal range of motion and neck supple. Skin:     General: Skin is warm and dry. Neurological:      Mental Status: He is alert and oriented to person, place, and time. Psychiatric:         Behavior: Behavior normal.         Thought Content: Thought content normal.         Judgment: Judgment normal.         ASSESSMENT/DIAGNOSIS     Diagnosis Orders   1. DARON on CPAP     2. Obesity (BMI 30-39.9)     3. Hypersomnia     4. RLS (restless legs syndrome)            Plan   Do you need any equipment today?  Yes update supplies  - Continue Requip for RLS and gabapentin  - Download reviewed and discussed with patient  - He  was advised to continue

## 2022-07-11 DIAGNOSIS — J44.1 COPD EXACERBATION (HCC): ICD-10-CM

## 2022-07-11 DIAGNOSIS — J44.9 CHRONIC OBSTRUCTIVE PULMONARY DISEASE, UNSPECIFIED COPD TYPE (HCC): ICD-10-CM

## 2022-07-12 RX ORDER — GABAPENTIN 100 MG/1
CAPSULE ORAL
Qty: 30 CAPSULE | Refills: 3 | Status: SHIPPED | OUTPATIENT
Start: 2022-07-12 | End: 2022-10-10

## 2022-07-12 NOTE — TELEPHONE ENCOUNTER
Received refill request for Gabapentin [address] . Medication was last ordered by Miguelina Saunders. Medication was last ordered on Gabapentin-1/17/22 with 3 refills, Proair 2/1/22 5 refills. Patient was last seen in the office 2/1/22. Patient has a scheduled follow up 1/31/23. Medication needs to be sent to [facility].

## 2022-10-11 RX ORDER — ROPINIROLE 0.25 MG/1
TABLET, FILM COATED ORAL
Qty: 60 TABLET | Refills: 5 | Status: SHIPPED | OUTPATIENT
Start: 2022-10-11

## 2022-11-13 DIAGNOSIS — J44.9 CHRONIC OBSTRUCTIVE PULMONARY DISEASE, UNSPECIFIED COPD TYPE (HCC): ICD-10-CM

## 2022-11-13 DIAGNOSIS — J44.1 COPD EXACERBATION (HCC): ICD-10-CM

## 2022-11-15 RX ORDER — ALBUTEROL SULFATE 90 UG/1
AEROSOL, METERED RESPIRATORY (INHALATION)
Qty: 8.5 G | Refills: 2 | Status: SHIPPED | OUTPATIENT
Start: 2022-11-15

## 2022-11-15 NOTE — TELEPHONE ENCOUNTER
Received refill request for Proair. Medication was last ordered by April Rg. Medication was last ordered on 7/12/22 with 2 refills. Patient was last seen in the office 2/1/22. Does patient have a scheduled follow up?: yes - 1/31/23    Medication needs to be sent to [address] [place] [address] [phone]. Thank you, please advise!     Patient's Allergies:  No Known Allergies

## 2023-01-31 ENCOUNTER — OFFICE VISIT (OUTPATIENT)
Dept: PULMONOLOGY | Age: 58
End: 2023-01-31
Payer: MEDICARE

## 2023-01-31 VITALS
OXYGEN SATURATION: 96 % | HEIGHT: 69 IN | DIASTOLIC BLOOD PRESSURE: 76 MMHG | SYSTOLIC BLOOD PRESSURE: 142 MMHG | TEMPERATURE: 98.2 F | BODY MASS INDEX: 34.54 KG/M2 | HEART RATE: 87 BPM | WEIGHT: 233.2 LBS

## 2023-01-31 DIAGNOSIS — Z99.89 OSA ON CPAP: Primary | ICD-10-CM

## 2023-01-31 DIAGNOSIS — G47.10 HYPERSOMNIA: ICD-10-CM

## 2023-01-31 DIAGNOSIS — G47.33 OSA ON CPAP: Primary | ICD-10-CM

## 2023-01-31 DIAGNOSIS — E66.9 OBESITY (BMI 30-39.9): ICD-10-CM

## 2023-01-31 DIAGNOSIS — G25.81 RLS (RESTLESS LEGS SYNDROME): ICD-10-CM

## 2023-01-31 PROCEDURE — G8484 FLU IMMUNIZE NO ADMIN: HCPCS | Performed by: PHYSICIAN ASSISTANT

## 2023-01-31 PROCEDURE — 3017F COLORECTAL CA SCREEN DOC REV: CPT | Performed by: PHYSICIAN ASSISTANT

## 2023-01-31 PROCEDURE — 99214 OFFICE O/P EST MOD 30 MIN: CPT | Performed by: PHYSICIAN ASSISTANT

## 2023-01-31 PROCEDURE — 3077F SYST BP >= 140 MM HG: CPT | Performed by: PHYSICIAN ASSISTANT

## 2023-01-31 PROCEDURE — G8427 DOCREV CUR MEDS BY ELIG CLIN: HCPCS | Performed by: PHYSICIAN ASSISTANT

## 2023-01-31 PROCEDURE — 3078F DIAST BP <80 MM HG: CPT | Performed by: PHYSICIAN ASSISTANT

## 2023-01-31 PROCEDURE — G8417 CALC BMI ABV UP PARAM F/U: HCPCS | Performed by: PHYSICIAN ASSISTANT

## 2023-01-31 PROCEDURE — 1036F TOBACCO NON-USER: CPT | Performed by: PHYSICIAN ASSISTANT

## 2023-01-31 ASSESSMENT — ENCOUNTER SYMPTOMS
DIARRHEA: 0
SHORTNESS OF BREATH: 1
EYES NEGATIVE: 1
STRIDOR: 0
NAUSEA: 0
CHEST TIGHTNESS: 0
WHEEZING: 0
ALLERGIC/IMMUNOLOGIC NEGATIVE: 1
BACK PAIN: 0
COUGH: 1

## 2023-01-31 NOTE — PROGRESS NOTES
[facility] Pul[facility], Critical Care [facility] Sleep Medicine      Teresa Jimenez         [id number]  1/31/2023   Chief Complaint   Patient presents with    Follow-up     1yr DARON f/u w/[employer] download. Unable to get most recent download d/t [employer] had an update to their system over the past weekend. Pt of Dr. Sandra Reyes     PAP Download:   Original or initial AHI: 53.9     Date of initial study: 5/21/2013      Compliant  No data%     Noncompliant No data %     PAP Type CPAP   Level  [id number]   Avg Hrs/Day No data  AHI: 1.8   Recorded compliance dates , 10/19/22  to 11/17/22   Machine/Mfg:   [] ResMed    [x] Respironics/Dreamstation   Interface:   [] Nasal    [] Nasal pillows   [x] FFM      Provider:      [] SR-HME     [][employer]     [x] [employer]    [] Marleni Connor    [] [place]               [] [employer]      [] Adaptive    [] [address]      [] Other    Neck Size: 18  Mallampati 4  ESS:  10  SAQLI: 32    Here is a scan of the most recent download:        Presentation:   Arlene Grande presents for sleep medicine follow up for obstructive sleep apnea  Since the last visit, Arlene Grande is doing well with PAP. He has not worn PAP since Nov.   He stopped wearing when he was helping with his Dad. He is getting some benefit from Requip for RLS but has been worse the past few months    Equipment issues: The pressure is  acceptable, the mask is acceptable     Sleep issues:  Do you feel better? No  More rested? No   Better concentration? no    Progress History:   Since last visit any new medical issues? Yes bronchitis   New ER or hospital visits? No  Any new or changes in medicines? No  Any new sleep medicines? No    Review of Systems -   Review of Systems   Constitutional:  Negative for activity change, appetite change, chills and fever. HENT:  Negative for congestion and postnasal drip. Eyes: Negative. Respiratory:  Positive for cough and shortness of breath. Negative for chest tightness, wheezing and stridor. Cardiovascular:  Negative for chest pain and leg swelling. Gastrointestinal:  Negative for diarrhea and nausea. Endocrine: Negative. Genitourinary: Negative. Musculoskeletal: Negative. Negative for arthralgias and back pain. Skin: Negative. Allergic/Immunologic: Negative. Neurological: Negative. Negative for dizziness and light-headedness. Psychiatric/Behavioral: Negative. All other systems reviewed and are negative. Physical Exam:    BMI:  Body mass index is 34.44 kg/m². Wt Readings from Last 3 Encounters:   01/31/23 233 lb 3.2 oz (105.8 kg)   02/01/22 236 lb 6.4 oz (107.2 kg)   06/10/21 242 lb 12.8 oz (110.1 kg)     Weight stable / unchanged  Vitals: BP (!) 142/76 (Site: Left Upper Arm, Position: Sitting, Cuff Size: Large Adult)   Pulse 87   Temp 98.2 °F (36.8 °C) (Oral)   Ht 5' 9\" (1.753 m)   Wt 233 lb 3.2 oz (105.8 kg)   SpO2 96% Comment: r/a  BMI 34.44 kg/m²       Physical Exam  Constitutional:       Appearance: Normal appearance. He is normal weight. HENT:      Head: Normocephalic and atraumatic. Right Ear: External ear normal.      Left Ear: External ear normal.      Nose: Nose normal.   Eyes:      Extraocular Movements: Extraocular movements intact. Conjunctiva/sclera: Conjunctivae normal.      Pupils: Pupils are equal, round, and reactive to light. Pulmonary:      Effort: Pulmonary effort is normal.   Musculoskeletal:      Cervical back: Normal range of motion and neck supple. Neurological:      General: No focal deficit present. Mental Status: He is alert and oriented to person, place, and time. Psychiatric:         Attention and Perception: Attention and perception normal.         Mood and Affect: Mood and affect normal.         Speech: Speech normal.         Behavior: Behavior normal. Behavior is cooperative. Thought Content:  Thought content normal.         Cognition and Memory: Cognition normal.         Judgment: Judgment normal. ASSESSMENT/DIAGNOSIS     Diagnosis Orders   1. DARON on CPAP        2. Obesity (BMI 30-39.9)        3. RLS (restless legs syndrome)                 Plan   Do you need any equipment today? No   - instructed to start wearing PAP again  - Discussed the severity of his DARON   - hypersomnia related to untreated DARON  - Discussed that cough from bronchitis may last for 12 weeks   - treating DARON may improve RLS- cont Requip   - Download reviewed and discussed with patient  - He  was advised to continue current positive airway pressure therapy with above described pressure. - He  advised to keep good compliance with current recommended pressure to get optimal results and clinical improvement  - Recommend 7-9 hours of sleep with PAP  - He was advised to call [employer] regarding supplies if needed.   -He call my office for earlier appointment if needed for worsening of sleep symptoms.   - He was instructed on weight loss  - Carri Matute was educated about my impression and plan. Patient verbalizesunderstanding.   We will see Dorie Aleman III back in: 4 months with download    Information added by my medical assistant/LPN was reviewed today       Sandy Wtakins, [facility] for pulmonary and Sleep Medicine  1/31/2023

## 2023-02-05 DIAGNOSIS — J44.9 CHRONIC OBSTRUCTIVE PULMONARY DISEASE, UNSPECIFIED COPD TYPE (HCC): ICD-10-CM

## 2023-02-05 DIAGNOSIS — J44.1 COPD EXACERBATION (HCC): ICD-10-CM

## 2023-02-06 RX ORDER — ALBUTEROL SULFATE 90 UG/1
AEROSOL, METERED RESPIRATORY (INHALATION)
Qty: 8.5 G | Refills: 2 | OUTPATIENT
Start: 2023-02-06

## 2023-02-21 DIAGNOSIS — J44.1 COPD EXACERBATION (HCC): ICD-10-CM

## 2023-02-21 DIAGNOSIS — J44.9 CHRONIC OBSTRUCTIVE PULMONARY DISEASE, UNSPECIFIED COPD TYPE (HCC): ICD-10-CM

## 2023-02-22 RX ORDER — ALBUTEROL SULFATE 90 UG/1
AEROSOL, METERED RESPIRATORY (INHALATION)
Qty: 8.5 G | Refills: 2 | Status: SHIPPED | OUTPATIENT
Start: 2023-02-22

## 2023-02-22 NOTE — TELEPHONE ENCOUNTER
Received refill request for ALBUTEROL HFA 90 MCG INHALER. Medication was last ordered by Mabel Sherman. Medication was last ordered on [de-identified]  with 2 refills. Patient was last seen in the office 1.31.23. Patient has a scheduled follow up 6.5. 23. Medication needs to be sent to [address] [place] [address] -  [phone] Pharmacy. Pt was last seen for pulm by pilo in 2020.

## 2023-05-07 DIAGNOSIS — J44.1 COPD EXACERBATION (HCC): ICD-10-CM

## 2023-05-07 DIAGNOSIS — J44.9 CHRONIC OBSTRUCTIVE PULMONARY DISEASE, UNSPECIFIED COPD TYPE (HCC): ICD-10-CM

## 2023-05-08 RX ORDER — ROPINIROLE 0.25 MG/1
TABLET, FILM COATED ORAL
Qty: 60 TABLET | Refills: 5 | Status: SHIPPED | OUTPATIENT
Start: 2023-05-08

## 2023-05-08 RX ORDER — ALBUTEROL SULFATE 90 UG/1
AEROSOL, METERED RESPIRATORY (INHALATION)
Qty: 18 G | Refills: 4 | Status: SHIPPED | OUTPATIENT
Start: 2023-05-08

## 2023-05-08 NOTE — TELEPHONE ENCOUNTER
Received refill request for ALBUTEROL HFA 90 MCG INHALER. Medication was last ordered by rupesh . Medication was last ordered on 2.22.23 with 2 refills. Received refill request for requip. Medication was last ordered by rupesh. Medication was last ordered on 10.11.23 with 5 refills. Patient was last seen in the office 1.31.23. Patient has a scheduled follow up 6.5. 23. Medication needs to be sent to [facility].

## 2023-06-05 ENCOUNTER — OFFICE VISIT (OUTPATIENT)
Dept: PULMONOLOGY | Age: 58
End: 2023-06-05

## 2023-06-05 VITALS
HEART RATE: 90 BPM | WEIGHT: 230 LBS | DIASTOLIC BLOOD PRESSURE: 86 MMHG | SYSTOLIC BLOOD PRESSURE: 138 MMHG | TEMPERATURE: 96.8 F | OXYGEN SATURATION: 95 % | HEIGHT: 69 IN | BODY MASS INDEX: 34.07 KG/M2

## 2023-06-05 DIAGNOSIS — G47.33 OSA ON CPAP: Primary | ICD-10-CM

## 2023-06-05 DIAGNOSIS — G25.81 RLS (RESTLESS LEGS SYNDROME): ICD-10-CM

## 2023-06-05 DIAGNOSIS — G47.10 HYPERSOMNIA: ICD-10-CM

## 2023-06-05 DIAGNOSIS — E66.9 OBESITY (BMI 30-39.9): ICD-10-CM

## 2023-06-05 DIAGNOSIS — Z99.89 OSA ON CPAP: Primary | ICD-10-CM

## 2023-06-05 DIAGNOSIS — J44.9 CHRONIC OBSTRUCTIVE PULMONARY DISEASE, UNSPECIFIED COPD TYPE (HCC): ICD-10-CM

## 2023-06-05 PROCEDURE — 99214 OFFICE O/P EST MOD 30 MIN: CPT | Performed by: PHYSICIAN ASSISTANT

## 2023-06-05 PROCEDURE — 3079F DIAST BP 80-89 MM HG: CPT | Performed by: PHYSICIAN ASSISTANT

## 2023-06-05 PROCEDURE — 3075F SYST BP GE 130 - 139MM HG: CPT | Performed by: PHYSICIAN ASSISTANT

## 2023-06-05 RX ORDER — HYDROXYZINE HYDROCHLORIDE 25 MG/1
25 TABLET, FILM COATED ORAL NIGHTLY PRN
Qty: 30 TABLET | Refills: 3 | Status: SHIPPED | OUTPATIENT
Start: 2023-06-05 | End: 2023-10-03

## 2023-06-05 RX ORDER — AMITRIPTYLINE HYDROCHLORIDE 50 MG/1
50 TABLET, FILM COATED ORAL NIGHTLY
COMMUNITY
Start: 2023-05-18

## 2023-06-05 ASSESSMENT — ENCOUNTER SYMPTOMS
STRIDOR: 0
CHEST TIGHTNESS: 0
COUGH: 0
BACK PAIN: 0
SHORTNESS OF BREATH: 0
DIARRHEA: 0
WHEEZING: 0
ALLERGIC/IMMUNOLOGIC NEGATIVE: 1
NAUSEA: 0
EYES NEGATIVE: 1

## 2023-06-15 PROBLEM — J01.00 ACUTE MAXILLARY SINUSITIS: Status: ACTIVE | Noted: 2022-04-19

## 2023-06-15 PROBLEM — R06.2 SYMPTOM OF WHEEZING: Status: ACTIVE | Noted: 2020-01-24

## 2023-06-15 PROBLEM — E66.9 OBESITY: Status: ACTIVE | Noted: 2020-06-03

## 2023-06-26 ENCOUNTER — TELEPHONE (OUTPATIENT)
Dept: PULMONOLOGY | Age: 58
End: 2023-06-26

## 2023-09-11 ENCOUNTER — TELEPHONE (OUTPATIENT)
Dept: PULMONOLOGY | Age: 58
End: 2023-09-11

## 2023-09-11 NOTE — TELEPHONE ENCOUNTER
Pt currently on the schedule for an CT lung screening 9/13 and his insurance is no longer in network with [facility].  The CT will need canceled as the services will be uncovered

## 2023-09-13 NOTE — TELEPHONE ENCOUNTER
Already dicussed with patient regarding appointment and he will call our office once his just cause has been issued.

## 2023-09-13 NOTE — TELEPHONE ENCOUNTER
Thiago Contreras there is a PA form that will need filled out for [employer]. I can send those to you if needed. The patient needs contacted to advise this will be out of Network if they approve the PA. The patient needs called, advise the best thing to do is call the 800 number to start the process of changing his insurance, if [employer] approves it will be an out of network charge. Yadira please call the patient and give him that information.

## 2024-05-20 DIAGNOSIS — J44.9 CHRONIC OBSTRUCTIVE PULMONARY DISEASE, UNSPECIFIED COPD TYPE (HCC): ICD-10-CM

## 2024-05-20 DIAGNOSIS — J44.1 COPD EXACERBATION (HCC): ICD-10-CM

## 2024-05-20 RX ORDER — ALBUTEROL SULFATE 90 UG/1
AEROSOL, METERED RESPIRATORY (INHALATION)
Qty: 8.5 G | Refills: 5 | Status: ON HOLD | OUTPATIENT
Start: 2024-05-20

## 2024-05-20 NOTE — TELEPHONE ENCOUNTER
Received refill request for albuterol hfa. Medication was last ordered by rupesh castellanos. Medication was last ordered on 5-8-23 with 4 refills.   Patient was last seen in the office 6-15-23. Patient has a scheduled follow up none scheduled.   Medication needs to be sent to [facility].

## 2024-05-26 ENCOUNTER — APPOINTMENT (OUTPATIENT)
Dept: GENERAL RADIOLOGY | Age: 59
DRG: 347 | End: 2024-05-26
Payer: MEDICAID

## 2024-05-26 ENCOUNTER — HOSPITAL ENCOUNTER (INPATIENT)
Age: 59
LOS: 4 days | Discharge: HOME HEALTH | DRG: 347 | End: 2024-05-30
Attending: EMERGENCY MEDICINE | Admitting: SURGERY
Payer: MEDICAID

## 2024-05-26 ENCOUNTER — APPOINTMENT (OUTPATIENT)
Dept: CT IMAGING | Age: 59
DRG: 347 | End: 2024-05-26
Payer: MEDICAID

## 2024-05-26 DIAGNOSIS — S22.42XA CLOSED FRACTURE OF MULTIPLE RIBS OF LEFT SIDE, INITIAL ENCOUNTER: ICD-10-CM
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
## 2024-05-26 DIAGNOSIS — S01.01XA LACERATION OF SCALP, INITIAL ENCOUNTER: ICD-10-CM

## 2024-05-26 DIAGNOSIS — T07.XXXA ABRASIONS OF MULTIPLE SITES: ICD-10-CM

## 2024-05-26 DIAGNOSIS — S32.009A CLOSED FRACTURE OF TRANSVERSE PROCESS OF LUMBAR VERTEBRA, INITIAL ENCOUNTER (HCC): ICD-10-CM

## 2024-05-26 DIAGNOSIS — S92.404A CLOSED NONDISPLACED FRACTURE OF PHALANX OF RIGHT GREAT TOE, UNSPECIFIED PHALANX, INITIAL ENCOUNTER: ICD-10-CM

## 2024-05-26 DIAGNOSIS — S02.401A CLOSED FRACTURE OF MAXILLARY SINUS, INITIAL ENCOUNTER (HCC): ICD-10-CM
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
## 2024-05-26 PROBLEM — S92.309A METATARSAL BONE FRACTURE: Status: ACTIVE | Noted: 2024-05-26

## 2024-05-26 PROBLEM — S02.2XXA FRACTURE OF NASAL BONE: Status: ACTIVE | Noted: 2024-05-26

## 2024-05-26 LAB
ABO: NORMAL
ALBUMIN SERPL BCG-MCNC: 4.3 G/DL (ref 3.5–5.1)
ALP SERPL-CCNC: 87 U/L (ref 38–126)
ALT SERPL W/O P-5'-P-CCNC: 18 U/L (ref 11–66)
ANION GAP SERPL CALC-SCNC: 12 MEQ/L (ref 8–16)
ANTIBODY SCREEN: NORMAL
APTT PPP: 28.7 SECONDS (ref 22–38)
AST SERPL-CCNC: 25 U/L (ref 5–40)
BILIRUB CONJ SERPL-MCNC: < 0.2 MG/DL (ref 0–0.3)
BILIRUB SERPL-MCNC: 1.1 MG/DL (ref 0.3–1.2)
BUN SERPL-MCNC: 11 MG/DL (ref 7–22)
CHLORIDE SERPL-SCNC: 102 MEQ/L (ref 98–111)
CO2 SERPL-SCNC: 25 MEQ/L (ref 23–33)
CREAT SERPL-MCNC: 0.9 MG/DL (ref 0.4–1.2)
DEPRECATED RDW RBC AUTO: 48.5 FL (ref 35–45)
EKG ATRIAL RATE: 98 BPM
EKG P AXIS: 77 DEGREES
EKG P-R INTERVAL: 128 MS
EKG Q-T INTERVAL: 350 MS
EKG QRS DURATION: 84 MS
EKG QTC CALCULATION (BAZETT): 446 MS
EKG R AXIS: 79 DEGREES
EKG T AXIS: 76 DEGREES
EKG VENTRICULAR RATE: 98 BPM
ERYTHROCYTE [DISTWIDTH] IN BLOOD BY AUTOMATED COUNT: 13.7 % (ref 11.5–14.5)
ETHANOL SERPL-MCNC: < 0.01 % (ref 0–0.08)
ETHANOL SERPL-MCNC: < 0.01 % (ref 0–0.08)
GFR SERPL CREATININE-BSD FRML MDRD: > 90 ML/MIN/1.73M2
GLUCOSE SERPL-MCNC: 132 MG/DL (ref 70–108)
HCT VFR BLD AUTO: 43.9 % (ref 42–52)
HGB BLD-MCNC: 14.6 GM/DL (ref 14–18)
INR PPP: 1.09 (ref 0.85–1.13)
LIPASE SERPL-CCNC: 23.4 U/L (ref 5.6–51.3)
MCH RBC QN AUTO: 32 PG (ref 26–33)
MCHC RBC AUTO-ENTMCNC: 33.3 GM/DL (ref 32.2–35.5)
MCV RBC AUTO: 96.3 FL (ref 80–94)
OSMOLALITY SERPL CALC.SUM OF ELEC: 278.8 MOSMOL/KG (ref 275–300)
PLATELET # BLD AUTO: 230 THOU/MM3 (ref 130–400)
PMV BLD AUTO: 10.4 FL (ref 9.4–12.4)
POTASSIUM SERPL-SCNC: 3.8 MEQ/L (ref 3.5–5.2)
PROT SERPL-MCNC: 7 G/DL (ref 6.1–8)
RBC # BLD AUTO: 4.56 MILL/MM3 (ref 4.7–6.1)
REASON FOR REJECTION: NORMAL
REJECTED TEST: NORMAL
RH FACTOR: NORMAL
SODIUM SERPL-SCNC: 139 MEQ/L (ref 135–145)
TROPONIN, HIGH SENSITIVITY: 10 NG/L (ref 0–12)
WBC # BLD AUTO: 16.3 THOU/MM3 (ref 4.8–10.8)

## 2024-05-26 PROCEDURE — 2580000003 HC RX 258: Performed by: STUDENT IN AN ORGANIZED HEALTH CARE EDUCATION/TRAINING PROGRAM

## 2024-05-26 PROCEDURE — 6360000004 HC RX CONTRAST MEDICATION: Performed by: STUDENT IN AN ORGANIZED HEALTH CARE EDUCATION/TRAINING PROGRAM

## 2024-05-26 PROCEDURE — 6360000002 HC RX W HCPCS: Performed by: STUDENT IN AN ORGANIZED HEALTH CARE EDUCATION/TRAINING PROGRAM

## 2024-05-26 PROCEDURE — 1200000000 HC SEMI PRIVATE

## 2024-05-26 PROCEDURE — 84484 ASSAY OF TROPONIN QUANT: CPT

## 2024-05-26 PROCEDURE — 93005 ELECTROCARDIOGRAM TRACING: CPT | Performed by: STUDENT IN AN ORGANIZED HEALTH CARE EDUCATION/TRAINING PROGRAM

## 2024-05-26 PROCEDURE — 85610 PROTHROMBIN TIME: CPT

## 2024-05-26 PROCEDURE — 96374 THER/PROPH/DIAG INJ IV PUSH: CPT

## 2024-05-26 PROCEDURE — 85730 THROMBOPLASTIN TIME PARTIAL: CPT

## 2024-05-26 PROCEDURE — 82947 ASSAY GLUCOSE BLOOD QUANT: CPT

## 2024-05-26 PROCEDURE — 2580000003 HC RX 258: Performed by: EMERGENCY MEDICINE

## 2024-05-26 PROCEDURE — 86901 BLOOD TYPING SEROLOGIC RH(D): CPT

## 2024-05-26 PROCEDURE — 12005 RPR S/N/A/GEN/TRK12.6-20.0CM: CPT

## 2024-05-26 PROCEDURE — 70486 CT MAXILLOFACIAL W/O DYE: CPT

## 2024-05-26 PROCEDURE — 96375 TX/PRO/DX INJ NEW DRUG ADDON: CPT

## 2024-05-26 PROCEDURE — 82565 ASSAY OF CREATININE: CPT

## 2024-05-26 PROCEDURE — 86850 RBC ANTIBODY SCREEN: CPT

## 2024-05-26 PROCEDURE — 90471 IMMUNIZATION ADMIN: CPT | Performed by: EMERGENCY MEDICINE

## 2024-05-26 PROCEDURE — 6370000000 HC RX 637 (ALT 250 FOR IP): Performed by: SURGERY

## 2024-05-26 PROCEDURE — 71260 CT THORAX DX C+: CPT

## 2024-05-26 PROCEDURE — 80076 HEPATIC FUNCTION PANEL: CPT

## 2024-05-26 PROCEDURE — 73130 X-RAY EXAM OF HAND: CPT

## 2024-05-26 PROCEDURE — 2580000003 HC RX 258: Performed by: SURGERY

## 2024-05-26 PROCEDURE — 99222 1ST HOSP IP/OBS MODERATE 55: CPT | Performed by: SURGERY

## 2024-05-26 PROCEDURE — 74177 CT ABD & PELVIS W/CONTRAST: CPT

## 2024-05-26 PROCEDURE — 90715 TDAP VACCINE 7 YRS/> IM: CPT | Performed by: EMERGENCY MEDICINE

## 2024-05-26 PROCEDURE — 2500000003 HC RX 250 WO HCPCS: Performed by: EMERGENCY MEDICINE

## 2024-05-26 PROCEDURE — 86900 BLOOD TYPING SEROLOGIC ABO: CPT

## 2024-05-26 PROCEDURE — 72125 CT NECK SPINE W/O DYE: CPT

## 2024-05-26 PROCEDURE — 82077 ASSAY SPEC XCP UR&BREATH IA: CPT

## 2024-05-26 PROCEDURE — 84520 ASSAY OF UREA NITROGEN: CPT

## 2024-05-26 PROCEDURE — 36415 COLL VENOUS BLD VENIPUNCTURE: CPT

## 2024-05-26 PROCEDURE — 76376 3D RENDER W/INTRP POSTPROCES: CPT

## 2024-05-26 PROCEDURE — 80051 ELECTROLYTE PANEL: CPT

## 2024-05-26 PROCEDURE — 85027 COMPLETE CBC AUTOMATED: CPT

## 2024-05-26 PROCEDURE — 70450 CT HEAD/BRAIN W/O DYE: CPT

## 2024-05-26 PROCEDURE — 0HQ0XZZ REPAIR SCALP SKIN, EXTERNAL APPROACH: ICD-10-PCS | Performed by: STUDENT IN AN ORGANIZED HEALTH CARE EDUCATION/TRAINING PROGRAM

## 2024-05-26 PROCEDURE — 73630 X-RAY EXAM OF FOOT: CPT

## 2024-05-26 PROCEDURE — 83690 ASSAY OF LIPASE: CPT

## 2024-05-26 PROCEDURE — 6360000002 HC RX W HCPCS: Performed by: SURGERY

## 2024-05-26 PROCEDURE — 99285 EMERGENCY DEPT VISIT HI MDM: CPT

## 2024-05-26 PROCEDURE — 6360000002 HC RX W HCPCS: Performed by: EMERGENCY MEDICINE

## 2024-05-26 PROCEDURE — 93010 ELECTROCARDIOGRAM REPORT: CPT | Performed by: INTERNAL MEDICINE

## 2024-05-26 RX ORDER — POTASSIUM CHLORIDE 7.45 MG/ML
10 INJECTION INTRAVENOUS PRN
Status: DISCONTINUED | OUTPATIENT
Start: 2024-05-26 | End: 2024-05-30 | Stop reason: HOSPADM

## 2024-05-26 RX ORDER — SODIUM CHLORIDE 0.9 % (FLUSH) 0.9 %
5-40 SYRINGE (ML) INJECTION EVERY 12 HOURS SCHEDULED
Status: DISCONTINUED | OUTPATIENT
Start: 2024-05-26 | End: 2024-05-30 | Stop reason: HOSPADM

## 2024-05-26 RX ORDER — SODIUM CHLORIDE 9 MG/ML
INJECTION, SOLUTION INTRAVENOUS PRN
Status: DISCONTINUED | OUTPATIENT
Start: 2024-05-26 | End: 2024-05-30 | Stop reason: HOSPADM

## 2024-05-26 RX ORDER — LIDOCAINE HYDROCHLORIDE AND EPINEPHRINE 10; 10 MG/ML; UG/ML
20 INJECTION, SOLUTION INFILTRATION; PERINEURAL ONCE
Status: DISCONTINUED | OUTPATIENT
Start: 2024-05-26 | End: 2024-05-30 | Stop reason: HOSPADM

## 2024-05-26 RX ORDER — ONDANSETRON 2 MG/ML
4 INJECTION INTRAMUSCULAR; INTRAVENOUS EVERY 6 HOURS PRN
Status: DISCONTINUED | OUTPATIENT
Start: 2024-05-26 | End: 2024-05-30 | Stop reason: HOSPADM

## 2024-05-26 RX ORDER — POLYETHYLENE GLYCOL 3350 17 G/17G
17 POWDER, FOR SOLUTION ORAL DAILY
Status: DISCONTINUED | OUTPATIENT
Start: 2024-05-26 | End: 2024-05-30 | Stop reason: HOSPADM

## 2024-05-26 RX ORDER — OXYCODONE HYDROCHLORIDE 5 MG/1
10 TABLET ORAL EVERY 4 HOURS PRN
Status: DISCONTINUED | OUTPATIENT
Start: 2024-05-26 | End: 2024-05-30 | Stop reason: HOSPADM

## 2024-05-26 RX ORDER — MORPHINE SULFATE 4 MG/ML
4 INJECTION, SOLUTION INTRAMUSCULAR; INTRAVENOUS ONCE
Status: COMPLETED | OUTPATIENT
Start: 2024-05-26 | End: 2024-05-26

## 2024-05-26 RX ORDER — FAMOTIDINE 20 MG/1
20 TABLET, FILM COATED ORAL 2 TIMES DAILY
Status: DISCONTINUED | OUTPATIENT
Start: 2024-05-26 | End: 2024-05-30 | Stop reason: HOSPADM

## 2024-05-26 RX ORDER — MAGNESIUM SULFATE IN WATER 40 MG/ML
2000 INJECTION, SOLUTION INTRAVENOUS PRN
Status: DISCONTINUED | OUTPATIENT
Start: 2024-05-26 | End: 2024-05-30 | Stop reason: HOSPADM

## 2024-05-26 RX ORDER — SODIUM CHLORIDE 0.9 % (FLUSH) 0.9 %
5-40 SYRINGE (ML) INJECTION PRN
Status: DISCONTINUED | OUTPATIENT
Start: 2024-05-26 | End: 2024-05-30 | Stop reason: HOSPADM

## 2024-05-26 RX ORDER — 0.9 % SODIUM CHLORIDE 0.9 %
1000 INTRAVENOUS SOLUTION INTRAVENOUS ONCE
Status: COMPLETED | OUTPATIENT
Start: 2024-05-26 | End: 2024-05-26

## 2024-05-26 RX ORDER — GINSENG 100 MG
CAPSULE ORAL 3 TIMES DAILY
Status: DISCONTINUED | OUTPATIENT
Start: 2024-05-26 | End: 2024-05-30 | Stop reason: HOSPADM

## 2024-05-26 RX ORDER — ONDANSETRON 4 MG/1
4 TABLET, ORALLY DISINTEGRATING ORAL EVERY 8 HOURS PRN
Status: DISCONTINUED | OUTPATIENT
Start: 2024-05-26 | End: 2024-05-30 | Stop reason: HOSPADM

## 2024-05-26 RX ORDER — OXYCODONE HYDROCHLORIDE 5 MG/1
5 TABLET ORAL EVERY 4 HOURS PRN
Status: DISCONTINUED | OUTPATIENT
Start: 2024-05-26 | End: 2024-05-30 | Stop reason: HOSPADM

## 2024-05-26 RX ORDER — POTASSIUM CHLORIDE 29.8 MG/ML
20 INJECTION INTRAVENOUS PRN
Status: DISCONTINUED | OUTPATIENT
Start: 2024-05-26 | End: 2024-05-30 | Stop reason: HOSPADM

## 2024-05-26 RX ORDER — LIDOCAINE HYDROCHLORIDE AND EPINEPHRINE 10; 10 MG/ML; UG/ML
20 INJECTION, SOLUTION INFILTRATION; PERINEURAL ONCE
Status: COMPLETED | OUTPATIENT
Start: 2024-05-26 | End: 2024-05-26

## 2024-05-26 RX ADMIN — SODIUM CHLORIDE 1000 ML: 9 INJECTION, SOLUTION INTRAVENOUS at 14:58

## 2024-05-26 RX ADMIN — FAMOTIDINE 20 MG: 20 TABLET, FILM COATED ORAL at 21:24

## 2024-05-26 RX ADMIN — SODIUM CHLORIDE 3000 MG: 900 INJECTION INTRAVENOUS at 18:53

## 2024-05-26 RX ADMIN — IOPAMIDOL 80 ML: 755 INJECTION, SOLUTION INTRAVENOUS at 14:38

## 2024-05-26 RX ADMIN — WATER 2000 MG: 1 INJECTION INTRAMUSCULAR; INTRAVENOUS; SUBCUTANEOUS at 14:16

## 2024-05-26 RX ADMIN — OXYCODONE HYDROCHLORIDE 10 MG: 5 TABLET ORAL at 18:28

## 2024-05-26 RX ADMIN — BACITRACIN: 500 OINTMENT TOPICAL at 19:19

## 2024-05-26 RX ADMIN — LIDOCAINE HYDROCHLORIDE AND EPINEPHRINE 20 ML: 10; 10 INJECTION, SOLUTION INFILTRATION; PERINEURAL at 15:53

## 2024-05-26 RX ADMIN — SODIUM CHLORIDE, PRESERVATIVE FREE 10 ML: 5 INJECTION INTRAVENOUS at 21:24

## 2024-05-26 RX ADMIN — HYDROMORPHONE HYDROCHLORIDE 0.5 MG: 1 INJECTION, SOLUTION INTRAMUSCULAR; INTRAVENOUS; SUBCUTANEOUS at 17:32

## 2024-05-26 RX ADMIN — MORPHINE SULFATE 4 MG: 4 INJECTION, SOLUTION INTRAMUSCULAR; INTRAVENOUS at 14:02

## 2024-05-26 RX ADMIN — HYDROMORPHONE HYDROCHLORIDE 0.5 MG: 1 INJECTION, SOLUTION INTRAMUSCULAR; INTRAVENOUS; SUBCUTANEOUS at 21:24

## 2024-05-26 RX ADMIN — TETANUS TOXOID, REDUCED DIPHTHERIA TOXOID AND ACELLULAR PERTUSSIS VACCINE, ADSORBED 0.5 ML: 5; 2.5; 8; 8; 2.5 SUSPENSION INTRAMUSCULAR at 14:16

## 2024-05-26 ASSESSMENT — PAIN DESCRIPTION - FREQUENCY
FREQUENCY: CONTINUOUS

## 2024-05-26 ASSESSMENT — PAIN DESCRIPTION - DESCRIPTORS
DESCRIPTORS: ACHING;SHARP
DESCRIPTORS: SHARP;STABBING
DESCRIPTORS: SHARP

## 2024-05-26 ASSESSMENT — PAIN SCALES - GENERAL
PAINLEVEL_OUTOF10: 10
PAINLEVEL_OUTOF10: 0
PAINLEVEL_OUTOF10: 8
PAINLEVEL_OUTOF10: 9
PAINLEVEL_OUTOF10: 10
PAINLEVEL_OUTOF10: 10

## 2024-05-26 ASSESSMENT — PAIN DESCRIPTION - ORIENTATION
ORIENTATION: OTHER (COMMENT)
ORIENTATION: RIGHT;LEFT
ORIENTATION: OTHER (COMMENT)
ORIENTATION: OTHER (COMMENT)

## 2024-05-26 ASSESSMENT — PAIN - FUNCTIONAL ASSESSMENT
PAIN_FUNCTIONAL_ASSESSMENT: PREVENTS OR INTERFERES SOME ACTIVE ACTIVITIES AND ADLS

## 2024-05-26 ASSESSMENT — PAIN DESCRIPTION - LOCATION
LOCATION: HEAD;ARM;BACK
LOCATION: ARM;HEAD
LOCATION: ARM;BACK;HEAD

## 2024-05-26 ASSESSMENT — PAIN DESCRIPTION - PAIN TYPE
TYPE: ACUTE PAIN

## 2024-05-26 ASSESSMENT — PAIN DESCRIPTION - ONSET
ONSET: ON-GOING
ONSET: ON-GOING

## 2024-05-26 NOTE — ED PROVIDER NOTES
PATIENT NAME: Luis Carlos Daigle III  MRN: [id number]  : 1965  DAVE: 2024    I performed a history and physical examination of the patient and discussed management with the Resident. I reviewed the Resident's note and agree with the documented findings and plan of care. Any areas of disagreement are noted on the chart. I was personally present for the key portions of any procedures and have documented in the chart those procedures where I was not present during the key portions. I have reviewed the emergency nurses triage note and agree with the chief complaint, past medical history, past surgical history, allergies, medications, social and family history as documented unless otherwise noted below.    MEDICAL DEDISION MAKING (MDM)     Luis Carlos Daigle III is a 58 y.o. male who presents to Emergency Department with Fall (8-10 feet ) and Head Injury     Fell off roof about 8-10 feet high and landed on left side body. This happened 4 hours ago. No LOC.   Significant road rashes to left arm, left shoulder, left side face as well as lacerations to left frontal and parietal scalp. Pain is rated at 10/10.    EKG interpreted by me as NSR, VR 98 bpm, AL interval 128 ms, QRS duration 84 ms,  ms, no acute ischemic change.    ED workup reveals left maxillary sinus fracture without extraocular muscle entrapment, left T11 and T12 posterior rib Fx, left L3 transverse process Fx, right 1st metatarsal Fx.     ED management include pain control, lac repair, IV ABx, tetanus booster, trauma consult, spine consult and podiatry consult. Trauma admission.     Vitals:    24 1355 24 1415 24 1457 24 1715   BP: (!) 158/82  (!) 158/82 (!) 171/86   Pulse: 96 92 96 98   Resp:    Temp:    98.1 °F (36.7 °C)   TempSrc:    Oral   SpO2:   94% 97%   Weight:         Labs Reviewed   CBC - Abnormal; Notable for the following components:       Result Value    WBC 16.3 (*)     RBC 4.56 (*)     MCV 96.3 (*)      above.         **This report has been created using voice recognition software.  It may contain   minor errors which are inherent in voice recognition technology.**      CT Reconstruct W/O Post Process Lumbar   Final Result   1. Minimally displaced fracture of the left transverse process of L3.   2. Bilateral adrenal hyperplasia.   3. Mild hepatic steatosis.   4. Other findings as described above.         **This report has been created using voice recognition software.  It may contain   minor errors which are inherent in voice recognition technology.**      CT Reconstruct W/O Post Process Thoracic   Final Result   1. Nondisplaced fractures of the posterior left 11th and 12th ribs.   2. Bibasilar subsegmental atelectasis.   3. Other findings as described above.            **This report has been created using voice recognition software.  It may contain   minor errors which are inherent in voice recognition technology.**      CT FACIAL BONES WO CONTRAST   Final Result      1. Fracture involving the anterior wall of the left maxillary sinus.   2. Slight irregularity along the lateral wall of left orbit which may now   presenting nondisplaced fracture.   3. Opacification of the left maxillary sinus and to a lesser extent in the   ethmoid air cells bilaterally and right maxillary sinus.         **This report has been created using voice recognition software. It may contain   minor errors which are inherent in voice recognition technology.**      XR HAND RIGHT (MIN 3 VIEWS)   Final Result   No fracture.            **This report has been created using voice recognition software. It may contain   minor errors which are inherent in voice recognition technology.**      XR FOOT RIGHT (MIN 3 VIEWS)   Final Result   1. Fracture at the base of the first metatarsal.               **This report has been created using voice recognition software. It may contain   minor errors which are inherent in voice recognition technology.**

## 2024-05-26 NOTE — H&P
meq/L    CO2 25 23 - 33 meq/L    Glucose 132 (H) 70 - 108 mg/dL    BUN 11 7 - 22 mg/dL    Creatinine 0.9 0.4 - 1.2 mg/dL   APTT   Result Value Ref Range    aPTT 28.7 22.0 - 38.0 seconds   Protime-INR - If Elevated refer to Anticoagulation Reversal for Injured Patient   Result Value Ref Range    INR 1.09 0.85 - 1.13   Lipase   Result Value Ref Range    Lipase 23.4 5.6 - 51.3 U/L   Hepatic function panel   Result Value Ref Range    Albumin 4.3 3.5 - 5.1 g/dL    Total Bilirubin 1.1 0.3 - 1.2 mg/dL    Bilirubin, Direct <0.2 0.0 - 0.3 mg/dL    Alkaline Phosphatase 87 38 - 126 U/L    AST 25 5 - 40 U/L    ALT 18 11 - 66 U/L    Total Protein 7.0 6.1 - 8.0 g/dL   Ethanol - ETOH Alcohol Level   Result Value Ref Range    Ethanol Lvl < 0.01 0.00 %   Troponin   Result Value Ref Range    Troponin, High Sensitivity 10 0 - 12 ng/L   Glomerular Filtration Rate, Estimated   Result Value Ref Range    Est, Glom Filt Rate > 90 >60 ml/min/1.73m2   Osmolality   Result Value Ref Range    Osmolality Calc 278.8 275.0 - 300.0 mOsmol/kg   Anion Gap   Result Value Ref Range    Anion Gap 12.0 8.0 - 16.0 meq/L   EKG 12 lead   Result Value Ref Range    Ventricular Rate 98 BPM    Atrial Rate 98 BPM    P-R Interval 128 ms    QRS Duration 84 ms    Q-T Interval 350 ms    QTc Calculation (Bazett) 446 ms    P Axis 77 degrees    R Axis 79 degrees    T Axis 76 degrees   TYPE AND SCREEN   Result Value Ref Range    ABO A     Rh Factor NEG     Antibody Screen NEG        Physical Exam:  Patient Vitals for the past 24 hrs:   BP Temp Temp src Pulse Resp SpO2 Weight   05/26/24 1457 (!) 158/82 -- -- 96 27 94 % --   05/26/24 1415 -- -- -- 92 22 -- --   05/26/24 1355 (!) 158/82 -- -- 96 23 -- --   05/26/24 1345 (!) 166/86 -- -- -- -- -- --   05/26/24 1344 -- 97.8 °F (36.6 °C) Oral 97 24 96 % 107 kg (236 lb)     Primary Assessment:  Airway: Patent, trachea midline  Breathing: Breath sounds present and equal bilaterally, spontaneous, and unlabored  Circulation:  which may now   presenting nondisplaced fracture.   3. Opacification of the left maxillary sinus and to a lesser extent in the   ethmoid air cells bilaterally and right maxillary sinus.         **This report has been created using voice recognition software. It may contain   minor errors which are inherent in voice recognition technology.**      XR HAND RIGHT (MIN 3 VIEWS)   Final Result   No fracture.            **This report has been created using voice recognition software. It may contain   minor errors which are inherent in voice recognition technology.**      XR FOOT RIGHT (MIN 3 VIEWS)   Final Result   1. Fracture at the base of the first metatarsal.               **This report has been created using voice recognition software. It may contain   minor errors which are inherent in voice recognition technology.**              Fast Exam: Yes    Electronically signed by Carley Moya MD on 5/26/2024 at 4:19 PM

## 2024-05-26 NOTE — ED NOTES
ED to inpatient nurses report      Chief Complaint:  Chief Complaint   Patient presents with    Fall     8-10 feet     Head Injury     Present to ED from: Home    MOA:     LOC: alert and orientated to name, place, date  Mobility: Independent  Oxygen Baseline: RA    Current needs required: RA     Code Status:   Full Code    What abnormal results were found and what did you give/do to treat them? Laceration repair  Any procedures or intervention occur?     Mental Status:  Level of Consciousness: Alert (0)    Psych Assessment:        Vitals:  Patient Vitals for the past 24 hrs:   BP Temp Temp src Pulse Resp SpO2 Weight   05/26/24 1457 (!) 158/82 -- -- 96 27 94 % --   05/26/24 1415 -- -- -- 92 22 -- --   05/26/24 1355 (!) 158/82 -- -- 96 23 -- --   05/26/24 1345 (!) 166/86 -- -- -- -- -- --   05/26/24 1344 -- 97.8 °F (36.6 °C) Oral 97 24 96 % 107 kg (236 lb)        LDAs:   Peripheral IV 05/26/24 Right Antecubital (Active)   Site Assessment Clean, dry & intact 05/26/24 1458   Line Status Infusing 05/26/24 1458   Line Care Connections checked and tightened 05/26/24 1458   Phlebitis Assessment No symptoms 05/26/24 1458   Infiltration Assessment 0 05/26/24 1458   Dressing Status Clean, dry & intact 05/26/24 1458   Dressing Intervention New 05/26/24 1458       Ambulatory Status:  Presents to emergency department  because of falls (Syncope, seizure, or loss of consciousness): Yes, Age > 70: No, Altered Mental Status, Intoxication with alcohol or substance confusion (Disorientation, impaired judgment, poor safety awaremess, or inability to follow instructions): Yes, Impaired Mobility: Ambulates or transfers with assistive devices or assistance; Unable to ambulate or transer.: Yes, Nursing Judgement: No    Diagnosis:  DISPOSITION Admitted 05/26/2024 04:32:51 PM   Final diagnoses:   Fall, initial encounter   Closed nondisplaced fracture of phalanx of right great toe, unspecified phalanx, initial encounter   Closed fracture of  maxillary sinus, initial encounter ([facility])   Closed fracture of multiple ribs of left side, initial encounter   Closed fracture of transverse process of lumbar vertebra, initial encounter ([facility])        Consults:  IP CONSULT TO OTOLARYNGOLOGY     Pain Score:  Pain Assessment  Pain Assessment: 0-10  Pain Level: 10  Pain Location: Chest, Foot, Abdomen, Flank, Back  Pain Orientation: Right, Left  Pain Type: Acute pain  Pain Frequency: Continuous    C-SSRS:   Risk of Suicide: No Risk    Sepsis Screening:  Sepsis Risk Score: 6.48    [place] Fall Risk:  [place] 1 Fall Risk  Presents to emergency department  because of falls (Syncope, seizure, or loss of consciousness): Yes  Age > 70: No  Altered Mental Status, Intoxication with alcohol or substance confusion (Disorientation, impaired judgment, poor safety awaremess, or inability to follow instructions): Yes  Impaired Mobility: Ambulates or transfers with assistive devices or assistance; Unable to ambulate or transer.: Yes  Nursing Judgement: No    Swallow Screening        Preferred Language:   English      ALLERGIES     Patient has no known allergies.    SURGICAL HISTORY       Past Surgical History:   Procedure Laterality Date    CARDIAC CATHETERIZATION  Dec 2012    CHOLECYSTECTOMY      COLONOSCOPY  02/16/2011    MANDIBLE FRACTURE SURGERY  1989    UPPER GASTROINTESTINAL ENDOSCOPY  12/14/2009    UPPER GASTROINTESTINAL ENDOSCOPY  9/25/13    sanchez's esophagus repeat 3 yr       PAST MEDICAL HISTORY       Past Medical History:   Diagnosis Date    Abdominal pain     Acute pharyngitis     Anxiety     Sanchez's esophagus     Chronic laryngitis     Constipation     COPD (chronic obstructive pulmonary disease) ([facility])     Depression     Fatigue     GERD (gastroesophageal reflux disease)     Hemorrhoids, internal, with bleeding     HEP C W/ COMA, ACUTE/NOS     Hyperlipidemia     Hypertension     IBS (irritable bowel syndrome)     Rectal bleeding     Shortness of breath     Weight loss

## 2024-05-26 NOTE — ED PROVIDER NOTES
[facility] EMERGENCY DEPT    Pt Name: Luis Carlos Daigle III  MRN: [id number]  Birthdate 1965  Date of evaluation: 5/26/2024  Resident Physician: Jenny Garcia MD  Supervising Physician: Dr. Sven Means MD    CHIEF COMPLAINT       Chief Complaint   Patient presents with    Fall     8-10 feet     Head Injury     HISTORY OF PRESENT ILLNESS   Luis Carlos Daigle III is a 58 y.o. male with PMHx of COPD, hypertension, hypercholesterolemia, obesity on baby aspirin  who presents to the emergency department for evaluation of fall.    Pt was on the roof cleaning branches off when he lost his footing and slipped.  Skid down the side of the roof and fell into the bushes.  States that the room was approximately 8 to 10 feet off of the ground.  Denies any loss of consciousness but the fall was unwitnessed.  Arrives to the ED with obvious large laceration over left side of scalp, left-sided ecchymoses to the orbit, as well as multiple abrasions on the upper extremities.  Primarily in the left upper extremity.  Patient also complaining of pain to the right foot.    Alert and oriented.  Is only on a baby aspirin.  No other thinners.    The patient has no other acute complaints at this time.    Review of Systems    Negative Except as Documented Above.    PASTMEDICAL HISTORY     Past Medical History:   Diagnosis Date    Abdominal pain     Acute pharyngitis     Anxiety     Webb's esophagus     Chronic laryngitis     Constipation     COPD (chronic obstructive pulmonary disease) ([facility])     Depression     Fatigue     GERD (gastroesophageal reflux disease)     Hemorrhoids, internal, with bleeding     HEP C W/ COMA, ACUTE/NOS     Hyperlipidemia     Hypertension     IBS (irritable bowel syndrome)     Rectal bleeding     Shortness of breath     Weight loss        Patient Active Problem List   Diagnosis Code    GERD (gastroesophageal reflux disease) K21.9    HEP C W/ COMA, ACUTE/NOS     Webb's esophagus K22.70    Depression F32.A    COPD  intact.      Pupils: Pupils are equal, round, and reactive to light.   Cardiovascular:      Rate and Rhythm: Normal rate and regular rhythm.      Pulses: Normal pulses.   Pulmonary:      Effort: Pulmonary effort is normal.      Breath sounds: Wheezing present.   Abdominal:      General: Abdomen is flat.      Palpations: Abdomen is soft.      Tenderness: There is abdominal tenderness in the right lower quadrant.   Musculoskeletal:         General: Tenderness present.      Cervical back: No tenderness.      Comments: Right anterior foot at  base of first digit, ankle swelling but no tenderness on palpation   Skin:     Capillary Refill: Capillary refill takes less than 2 seconds.      Findings: Lesion (multiple abrasions noted on BLUE) present.   Neurological:      Mental Status: He is alert and oriented to person, place, and time.   Psychiatric:         Mood and Affect: Mood normal.         Behavior: Behavior normal.         Thought Content: Thought content normal.       FORMAL DIAGNOSTIC RESULTS     RADIOLOGY: Interpretation per the Radiologist below, if available at the time of this note (none if blank):    CT head without contrast   Final Result      1. Soft tissue swelling in the scalp overlying the left frontal and temporal   calvarium and in the preseptal soft tissues over the left orbit.   2. Deformity of the anterior wall of the left maxillary sinus and lateral wall   of the left orbit.   3. Otherwise negative noncontrast CT scan of the brain.            **This report has been created using voice recognition software. It may contain   minor errors which are inherent in voice recognition technology.**      CT cervical spine without contrast   Final Result      1. Straightening of the normal cervical lordosis with superimposed cervical   spondylosis at C4-5, C5-6 and C6/7.   2. No acute fracture noted..               **This report has been created using voice recognition software. It may contain   minor errors

## 2024-05-26 NOTE — CONSULTS
Podiatric Surgery Consult    Reason for Consult:  R 1st metatarsal fracture  Requesting Physician:  Sven Means MD    CHIEF COMPLAINT:  Fall    HISTORY OF PRESENT ILLNESS:                The patient is a 58 y.o. male with significant past medical history of COPD, GERD, HTN who is seen at bedside on behalf of Dr. Edward. Patient states he has been seen by Dr. Edward in office for chronic gout of his right foot, has not had an episode in some time. Patient states a few hours ago he fell off of a roof about 10 feet high and injured multiple areas, including his back and head. He states he did not lose consciousness that he can recall, but does state the entire incident is hazy. He endorses 8/10 pain to his right foot, has not attempted to bear weight due to his injuries. States he last ate shortly after noon.     Past Medical History:        Diagnosis Date    Abdominal pain     Acute pharyngitis     Anxiety     Sanchez's esophagus     Chronic laryngitis     Constipation     COPD (chronic obstructive pulmonary disease) ([facility])     Depression     Fatigue     GERD (gastroesophageal reflux disease)     Hemorrhoids, internal, with bleeding     HEP C W/ COMA, ACUTE/NOS     Hyperlipidemia     Hypertension     IBS (irritable bowel syndrome)     Rectal bleeding     Shortness of breath     Weight loss      Past Surgical History:        Procedure Laterality Date    CARDIAC CATHETERIZATION  Dec 2012    CHOLECYSTECTOMY      COLONOSCOPY  02/16/2011    MANDIBLE FRACTURE SURGERY  1989    UPPER GASTROINTESTINAL ENDOSCOPY  12/14/2009    UPPER GASTROINTESTINAL ENDOSCOPY  9/25/13    sanchez's esophagus repeat 3 yr     Current Medications:    Current Facility-Administered Medications: ampicillin-sulbactam (UNASYN) 3,000 mg in sodium chloride 0.9 % 100 mL IVPB (mini-bag), 3,000 mg, IntraVENous, Once  lidocaine-EPINEPHrine 1 %-1:[id number] injection 20 mL, 20 mL, IntraDERmal, Once  sodium chloride flush 0.9 % injection 5-40 mL, 5-40 mL, IntraVENous,  pulmonary mass. Central airway is patent. No pleural effusions.  No significant pericardial effusion. The heart is not enlarged. Ascending thoracic aorta is not dilated.  The main pulmonary artery is not dilated. No significantly enlarged mediastinal or  axillary lymph node. The thyroid is not enlarged. No chest wall mass. Limited evaluation below the diaphragm shows hyperplasia of the adrenal glands. Calcified splenic granuloma. The gallbladder is surgically absent.. Bones: Degenerative changes of the thoracic spine.. Nondisplaced left 11th and 12th rib fractures are seen posteriorly. THORACIC SPINE: ALIGNMENT: The thoracic kyphosis is maintained FRACTURE: None DISC LEVEL: 1. The lack of intrathecal contrast limits the evaluation for  spinal stenosis or foraminal stenosis No significant central canal narrowing. No significant neural minor narrowing.     1. Nondisplaced fractures of the posterior left 11th and 12th ribs. 2. Bibasilar subsegmental atelectasis. 3. Other findings as described above. **This report has been created using voice recognition software.  It may contain minor errors which are inherent in voice recognition technology.**    CT FACIAL BONES WO CONTRAST    Result Date: 5/26/2024  PROCEDURE: CT FACIAL BONES WO CONTRAST CLINICAL INFORMATION: fall off roof, left periorbital ecchymosis COMPARISON: CT scan of the sinuses dated 4/9/2013.. TECHNIQUE: 3 mm contrast-enhanced axial CT images were obtained through the orbits. 3 mm coronal reconstructions were obtained. All CT scans at this facility use dose modulation, iterative reconstruction, and/or weight-based dosing when appropriate to reduce radiation dose to as low as reasonably achievable. FINDINGS: There is deformity of the anterior wall of the left maxillary sinus consistent with a fracture. There is slight irregularity along the lateral wall of the left orbit which may represent a nondisplaced fracture. The remaining facial bones appear to be

## 2024-05-27 ENCOUNTER — APPOINTMENT (OUTPATIENT)
Dept: GENERAL RADIOLOGY | Age: 59
DRG: 347 | End: 2024-05-27
Payer: MEDICAID

## 2024-05-27 LAB
ANION GAP SERPL CALC-SCNC: 12 MEQ/L (ref 8–16)
BUN SERPL-MCNC: 15 MG/DL (ref 7–22)
CALCIUM SERPL-MCNC: 8.3 MG/DL (ref 8.5–10.5)
CHLORIDE SERPL-SCNC: 102 MEQ/L (ref 98–111)
CO2 SERPL-SCNC: 25 MEQ/L (ref 23–33)
CREAT SERPL-MCNC: 1 MG/DL (ref 0.4–1.2)
GFR SERPL CREATININE-BSD FRML MDRD: 87 ML/MIN/1.73M2
GLUCOSE SERPL-MCNC: 124 MG/DL (ref 70–108)
POTASSIUM SERPL-SCNC: 3.7 MEQ/L (ref 3.5–5.2)
SODIUM SERPL-SCNC: 139 MEQ/L (ref 135–145)

## 2024-05-27 PROCEDURE — 94640 AIRWAY INHALATION TREATMENT: CPT

## 2024-05-27 PROCEDURE — 99232 SBSQ HOSP IP/OBS MODERATE 35: CPT | Performed by: SURGERY

## 2024-05-27 PROCEDURE — 36415 COLL VENOUS BLD VENIPUNCTURE: CPT

## 2024-05-27 PROCEDURE — 80048 BASIC METABOLIC PNL TOTAL CA: CPT

## 2024-05-27 PROCEDURE — 2580000003 HC RX 258: Performed by: SURGERY

## 2024-05-27 PROCEDURE — 6370000000 HC RX 637 (ALT 250 FOR IP): Performed by: OCCUPATIONAL THERAPIST

## 2024-05-27 PROCEDURE — 94799 UNLISTED PULMONARY SVC/PX: CPT

## 2024-05-27 PROCEDURE — 6370000000 HC RX 637 (ALT 250 FOR IP): Performed by: SURGERY

## 2024-05-27 PROCEDURE — 6360000002 HC RX W HCPCS: Performed by: OCCUPATIONAL THERAPIST

## 2024-05-27 PROCEDURE — APPNB30 APP NON BILLABLE TIME 0-30 MINS: Performed by: OCCUPATIONAL THERAPIST

## 2024-05-27 PROCEDURE — 94010 BREATHING CAPACITY TEST: CPT

## 2024-05-27 PROCEDURE — 94761 N-INVAS EAR/PLS OXIMETRY MLT: CPT

## 2024-05-27 PROCEDURE — 1200000000 HC SEMI PRIVATE

## 2024-05-27 PROCEDURE — 71045 X-RAY EXAM CHEST 1 VIEW: CPT

## 2024-05-27 RX ORDER — CETIRIZINE HYDROCHLORIDE 5 MG/1
5 TABLET ORAL DAILY
Status: DISCONTINUED | OUTPATIENT
Start: 2024-05-27 | End: 2024-05-30 | Stop reason: HOSPADM

## 2024-05-27 RX ORDER — IPRATROPIUM BROMIDE AND ALBUTEROL SULFATE 2.5; .5 MG/3ML; MG/3ML
1 SOLUTION RESPIRATORY (INHALATION)
Status: DISCONTINUED | OUTPATIENT
Start: 2024-05-27 | End: 2024-05-30 | Stop reason: HOSPADM

## 2024-05-27 RX ORDER — SILDENAFIL 100 MG/1
50 TABLET, FILM COATED ORAL DAILY PRN
COMMUNITY

## 2024-05-27 RX ORDER — BUPROPION HYDROCHLORIDE 300 MG/1
300 TABLET ORAL EVERY MORNING
Status: DISCONTINUED | OUTPATIENT
Start: 2024-05-28 | End: 2024-05-30 | Stop reason: HOSPADM

## 2024-05-27 RX ORDER — IPRATROPIUM BROMIDE AND ALBUTEROL SULFATE 2.5; .5 MG/3ML; MG/3ML
1 SOLUTION RESPIRATORY (INHALATION)
Status: DISCONTINUED | OUTPATIENT
Start: 2024-05-27 | End: 2024-05-27

## 2024-05-27 RX ORDER — BUPROPION HYDROCHLORIDE 150 MG/1
150 TABLET ORAL EVERY EVENING
COMMUNITY

## 2024-05-27 RX ORDER — CITALOPRAM 40 MG/1
40 TABLET ORAL DAILY
COMMUNITY

## 2024-05-27 RX ORDER — ALBUTEROL SULFATE 2.5 MG/3ML
2.5 SOLUTION RESPIRATORY (INHALATION)
Status: DISCONTINUED | OUTPATIENT
Start: 2024-05-27 | End: 2024-05-27

## 2024-05-27 RX ORDER — BUPROPION HYDROCHLORIDE 150 MG/1
150 TABLET ORAL EVERY EVENING
Status: DISCONTINUED | OUTPATIENT
Start: 2024-05-27 | End: 2024-05-30 | Stop reason: HOSPADM

## 2024-05-27 RX ORDER — TIOTROPIUM BROMIDE 18 UG/1
18 CAPSULE ORAL; RESPIRATORY (INHALATION) DAILY
COMMUNITY

## 2024-05-27 RX ORDER — METHOCARBAMOL 500 MG/1
1500 TABLET, FILM COATED ORAL 3 TIMES DAILY
Status: DISCONTINUED | OUTPATIENT
Start: 2024-05-27 | End: 2024-05-30 | Stop reason: HOSPADM

## 2024-05-27 RX ORDER — FLUTICASONE PROPIONATE 50 MCG
1 SPRAY, SUSPENSION (ML) NASAL DAILY
Status: DISCONTINUED | OUTPATIENT
Start: 2024-05-27 | End: 2024-05-30 | Stop reason: HOSPADM

## 2024-05-27 RX ORDER — ACETAMINOPHEN 500 MG
1000 TABLET ORAL EVERY 6 HOURS SCHEDULED
Status: DISCONTINUED | OUTPATIENT
Start: 2024-05-27 | End: 2024-05-30 | Stop reason: HOSPADM

## 2024-05-27 RX ORDER — IPRATROPIUM BROMIDE AND ALBUTEROL SULFATE 2.5; .5 MG/3ML; MG/3ML
1 SOLUTION RESPIRATORY (INHALATION) EVERY 4 HOURS PRN
Status: DISCONTINUED | OUTPATIENT
Start: 2024-05-27 | End: 2024-05-30 | Stop reason: HOSPADM

## 2024-05-27 RX ORDER — LIDOCAINE 4 G/G
1 PATCH TOPICAL DAILY
Status: DISCONTINUED | OUTPATIENT
Start: 2024-05-27 | End: 2024-05-30 | Stop reason: HOSPADM

## 2024-05-27 RX ORDER — AMITRIPTYLINE HYDROCHLORIDE 50 MG/1
50 TABLET, FILM COATED ORAL NIGHTLY
Status: DISCONTINUED | OUTPATIENT
Start: 2024-05-27 | End: 2024-05-27

## 2024-05-27 RX ORDER — BUPROPION HYDROCHLORIDE 300 MG/1
300 TABLET ORAL EVERY MORNING
COMMUNITY

## 2024-05-27 RX ORDER — GABAPENTIN 400 MG/1
400 CAPSULE ORAL 3 TIMES DAILY
COMMUNITY

## 2024-05-27 RX ORDER — CITALOPRAM 40 MG/1
40 TABLET ORAL DAILY
Status: DISCONTINUED | OUTPATIENT
Start: 2024-05-27 | End: 2024-05-30 | Stop reason: HOSPADM

## 2024-05-27 RX ADMIN — ACETAMINOPHEN 1000 MG: 500 TABLET ORAL at 18:04

## 2024-05-27 RX ADMIN — OXYCODONE HYDROCHLORIDE 10 MG: 5 TABLET ORAL at 00:08

## 2024-05-27 RX ADMIN — BACITRACIN: 500 OINTMENT TOPICAL at 15:23

## 2024-05-27 RX ADMIN — BACITRACIN: 500 OINTMENT TOPICAL at 20:21

## 2024-05-27 RX ADMIN — OXYCODONE HYDROCHLORIDE 10 MG: 5 TABLET ORAL at 13:12

## 2024-05-27 RX ADMIN — CETIRIZINE HYDROCHLORIDE 5 MG: 5 TABLET ORAL at 11:31

## 2024-05-27 RX ADMIN — OXYCODONE HYDROCHLORIDE 10 MG: 5 TABLET ORAL at 23:27

## 2024-05-27 RX ADMIN — METHOCARBAMOL TABLETS 1500 MG: 500 TABLET, COATED ORAL at 11:31

## 2024-05-27 RX ADMIN — HYDROMORPHONE HYDROCHLORIDE 1 MG: 1 INJECTION, SOLUTION INTRAMUSCULAR; INTRAVENOUS; SUBCUTANEOUS at 10:10

## 2024-05-27 RX ADMIN — FAMOTIDINE 20 MG: 20 TABLET, FILM COATED ORAL at 20:20

## 2024-05-27 RX ADMIN — IPRATROPIUM BROMIDE 0.5 MG: 0.5 SOLUTION RESPIRATORY (INHALATION) at 11:53

## 2024-05-27 RX ADMIN — METHOCARBAMOL TABLETS 1500 MG: 500 TABLET, COATED ORAL at 20:20

## 2024-05-27 RX ADMIN — HYDROMORPHONE HYDROCHLORIDE 1 MG: 1 INJECTION, SOLUTION INTRAMUSCULAR; INTRAVENOUS; SUBCUTANEOUS at 20:21

## 2024-05-27 RX ADMIN — ACETAMINOPHEN 1000 MG: 500 TABLET ORAL at 11:31

## 2024-05-27 RX ADMIN — OXYCODONE HYDROCHLORIDE 10 MG: 5 TABLET ORAL at 18:04

## 2024-05-27 RX ADMIN — ALBUTEROL SULFATE 2.5 MG: 2.5 SOLUTION RESPIRATORY (INHALATION) at 11:53

## 2024-05-27 RX ADMIN — FAMOTIDINE 20 MG: 20 TABLET, FILM COATED ORAL at 10:03

## 2024-05-27 RX ADMIN — SODIUM CHLORIDE, PRESERVATIVE FREE 10 ML: 5 INJECTION INTRAVENOUS at 10:04

## 2024-05-27 RX ADMIN — SODIUM CHLORIDE, PRESERVATIVE FREE 10 ML: 5 INJECTION INTRAVENOUS at 20:22

## 2024-05-27 RX ADMIN — BUPROPION HYDROCHLORIDE 150 MG: 150 TABLET, EXTENDED RELEASE ORAL at 20:20

## 2024-05-27 RX ADMIN — CITALOPRAM 40 MG: 40 TABLET, FILM COATED ORAL at 20:20

## 2024-05-27 RX ADMIN — IPRATROPIUM BROMIDE AND ALBUTEROL SULFATE 1 DOSE: .5; 3 SOLUTION RESPIRATORY (INHALATION) at 19:43

## 2024-05-27 RX ADMIN — BACITRACIN: 500 OINTMENT TOPICAL at 10:03

## 2024-05-27 ASSESSMENT — PAIN DESCRIPTION - LOCATION
LOCATION: LEG
LOCATION: GENERALIZED

## 2024-05-27 ASSESSMENT — PATIENT HEALTH QUESTIONNAIRE - PHQ9
SUM OF ALL RESPONSES TO PHQ QUESTIONS 1-9: 0
SUM OF ALL RESPONSES TO PHQ9 QUESTIONS 1 & 2: 0
1. LITTLE INTEREST OR PLEASURE IN DOING THINGS: NOT AT ALL
2. FEELING DOWN, DEPRESSED OR HOPELESS: NOT AT ALL
SUM OF ALL RESPONSES TO PHQ QUESTIONS 1-9: 0

## 2024-05-27 ASSESSMENT — LIFESTYLE VARIABLES
HOW OFTEN DO YOU HAVE A DRINK CONTAINING ALCOHOL: MONTHLY OR LESS
HOW MANY STANDARD DRINKS CONTAINING ALCOHOL DO YOU HAVE ON A TYPICAL DAY: 3 OR 4

## 2024-05-27 ASSESSMENT — PAIN SCALES - GENERAL
PAINLEVEL_OUTOF10: 10
PAINLEVEL_OUTOF10: 0
PAINLEVEL_OUTOF10: 9
PAINLEVEL_OUTOF10: 8
PAINLEVEL_OUTOF10: 10
PAINLEVEL_OUTOF10: 10
PAINLEVEL_OUTOF10: 9
PAINLEVEL_OUTOF10: 0
PAINLEVEL_OUTOF10: 8
PAINLEVEL_OUTOF10: 7
PAINLEVEL_OUTOF10: 0
PAINLEVEL_OUTOF10: 8
PAINLEVEL_OUTOF10: 0

## 2024-05-27 ASSESSMENT — PAIN DESCRIPTION - DESCRIPTORS
DESCRIPTORS: ACHING;DISCOMFORT;THROBBING
DESCRIPTORS: ACHING;BURNING;SHARP;SHOOTING
DESCRIPTORS: SHOOTING;STABBING
DESCRIPTORS: DISCOMFORT;THROBBING
DESCRIPTORS: ACHING;DISCOMFORT;THROBBING

## 2024-05-27 ASSESSMENT — PAIN DESCRIPTION - ORIENTATION
ORIENTATION: OTHER (COMMENT)
ORIENTATION: OTHER (COMMENT)
ORIENTATION: LEFT
ORIENTATION: RIGHT;LEFT

## 2024-05-27 NOTE — PROGRESS NOTES
Respiratory Care is following the rib fracture order set. Patient's position when testing was done was low fowlers.  A Negative Inspiratory Force (NIF) was performed with patient achieving a NIF of -35 cm H2O. The NIF was greater than 25 cm H2O. A Forced Vital Capacity (FVC) was obtained with patient achieving an FVC of 1.62 liters. The patient's calculated ideal body weight, (IBW) is  70.7 kg. 0.020 liters/kg of the patient's IBW is 1.41 liters. The patient's FVC was greater than 0.020 liters/kg of IBW. Based on the spirometry measurement alone, patient does not meet ICU admission criteria. Last pain medication was given on  5/27 @ 1010. Physician was not called regarding spirometry measurement.

## 2024-05-27 NOTE — PROGRESS NOTES
the evaluation for  spinal stenosis or foraminal stenosis No significant central canal narrowing. No significant neural minor narrowing.     1. Nondisplaced fractures of the posterior left 11th and 12th ribs. 2. Bibasilar subsegmental atelectasis. 3. Other findings as described above. **This report has been created using voice recognition software.  It may contain minor errors which are inherent in voice recognition technology.**    CT Reconstruct W/O Post Process Thoracic    Result Date: 5/26/2024  PROCEDURE: CT CHEST W CONTRAST, CT THORACIC RECONSTRUCTION WO POST PROCESS CLINICAL INFORMATION: trauma COMPARISON: None TECHNIQUE: 1. Helical CT acquisition of the chest was performed with  administration of intravenous contrast. Multiplanar reformats are provided. 2. Reconstructed CT images of the thoracic spine from the CT chest examination. Multiplanar reformats are provided. All CT scans at this facility use dose modulation, iterative reconstruction, and/or weight based dosing when appropriate to reduce the radiation dose to as low as reasonably achievable. CONTRAST: 80 cc of Isovue-370 FINDINGS: CT CHEST: Subsegmental bibasilar atelectasis. Small hiatal hernia seen. Mild aortic arch calcifications. A small bleb is seen in the right lung apex. No focal pulmonary consolidation. No large pulmonary mass. Central airway is patent. No pleural effusions.  No significant pericardial effusion. The heart is not enlarged. Ascending thoracic aorta is not dilated.  The main pulmonary artery is not dilated. No significantly enlarged mediastinal or  axillary lymph node. The thyroid is not enlarged. No chest wall mass. Limited evaluation below the diaphragm shows hyperplasia of the adrenal glands. Calcified splenic granuloma. The gallbladder is surgically absent.. Bones: Degenerative changes of the thoracic spine.. Nondisplaced left 11th and 12th rib fractures are seen posteriorly. THORACIC SPINE: ALIGNMENT: The thoracic kyphosis  5/26/2024  PROCEDURE: CT HEAD WO CONTRAST CLINICAL INFORMATION: trauma. COMPARISON: No prior study. TECHNIQUE: Noncontrast 5 mm axial images were obtained through the brain. Sagittal and coronal reconstructions were obtained. All CT scans at this facility use dose modulation, iterative reconstruction, and/or weight-based dosing when appropriate to reduce radiation dose to as low as reasonably achievable. FINDINGS: There is soft tissue swelling in the scalp over the left frontal and temporal calvarium and in the preseptal soft tissues over the left orbit. There is no hemorrhage. There are no intra-or extra-axial collections.  There is no hydrocephalus, midline shift or mass effect.  The gray-white matter differentiation is preserved. There is deformity of the anterior wall of the left maxillary sinus and along the lateral wall of the left orbit. The remaining paranasal sinuses and mastoid air cells are normally aerated.  There is no suspicious calvarial abnormality.      1. Soft tissue swelling in the scalp overlying the left frontal and temporal calvarium and in the preseptal soft tissues over the left orbit. 2. Deformity of the anterior wall of the left maxillary sinus and lateral wall of the left orbit. 3. Otherwise negative noncontrast CT scan of the brain. **This report has been created using voice recognition software. It may contain minor errors which are inherent in voice recognition technology.**    XR FOOT RIGHT (MIN 3 VIEWS)    Result Date: 5/26/2024  PROCEDURE: XR FOOT RIGHT (MIN 3 VIEWS) CLINICAL INFORMATION: pain, trauma. COMPARISON: No prior study. TECHNIQUE: 4 views of the right foot. FINDINGS: There appears be a fracture at the base of the first metatarsal. The remaining bony structures are intact.. There is spurring at the attachment of the Achilles tendon upon the calcaneus. There is a tiny plantar spur.. The soft tissues are normal.     1. Fracture at the base of the first metatarsal. **This

## 2024-05-27 NOTE — RT PROTOCOL NOTE
RT Nebulizer Bronchodilator Protocol Note    There is a bronchodilator order in the chart from a provider indicating to follow the RT Bronchodilator Protocol and there is an “Initiate RT Bronchodilator Protocol” order as well (see protocol at bottom of note).    CXR Findings:  XR CHEST PORTABLE    Result Date: 5/27/2024  No acute pleural or pulmonary parenchymal process. This document has been electronically signed by: Jessica Vitale DO on 05/27/2024 03:17 AM      The findings from the last RT Protocol Assessment were as follows:  Smoking: Chronic pulmonary disease  Respiratory Pattern: Regular pattern and RR 12-20 bpm  Breath Sounds: Inspiratory and expiratory or bilateral wheezing and/or rhonchi  Cough: Strong, spontaneous, non-productive  Indication for Bronchodilator Therapy: On home bronchodilators  Bronchodilator Assessment Score: 8    Aerosolized bronchodilator medication orders have been revised according to the RT Nebulizer Bronchodilator Protocol below.    Respiratory Therapist to perform RT Therapy Protocol Assessment initially then follow the protocol.  Repeat RT Therapy Protocol Assessment PRN for score 0-3 or on second treatment, BID, and PRN for scores above 3.    No Indications - adjust the frequency to every 6 hours PRN wheezing or bronchospasm, if no treatments needed after 48 hours then discontinue using Per Protocol order mode.     If indication present, adjust the RT bronchodilator orders based on the Bronchodilator Assessment Score as indicated below.  If a patient is on this medication at home then do not decrease Frequency below that used at home.    0-3 - enter or revise RT bronchodilator order(s) to equivalent RT Bronchodilator order with Frequency of every 4 hours PRN for wheezing or increased work of breathing using Per Protocol order mode.       4-6 - enter or revise RT Bronchodilator order(s) to two equivalent RT bronchodilator orders with one order with BID Frequency and one order

## 2024-05-27 NOTE — PLAN OF CARE
Problem: Discharge Planning  Goal: Discharge to home or other facility with appropriate resources  Outcome: Progressing  Flowsheets (Taken 5/27/2024 0900)  Discharge to home or other facility with appropriate resources: Identify barriers to discharge with patient and caregiver     Problem: Pain  Goal: Verbalizes/displays adequate comfort level or baseline comfort level  Outcome: Progressing     Problem: Safety - Adult  Goal: Free from fall injury  Outcome: Progressing  Flowsheets (Taken 5/27/2024 1056)  Free From Fall Injury: Instruct family/caregiver on patient safety     Problem: ABCDS Injury Assessment  Goal: Absence of physical injury  Outcome: Progressing  Flowsheets (Taken 5/27/2024 1056)  Absence of Physical Injury: Implement safety measures based on patient assessment

## 2024-05-27 NOTE — CONSULTS
I reviewed the CT with the ER providers.     No surgical indications for facial bones. No need for ENT consult.    Electronically signed by MELISA CONLEY MD on 5/27/2024 at 6:27 PM

## 2024-05-28 ENCOUNTER — APPOINTMENT (OUTPATIENT)
Dept: MRI IMAGING | Age: 59
DRG: 347 | End: 2024-05-28
Payer: MEDICAID

## 2024-05-28 PROCEDURE — 6360000002 HC RX W HCPCS: Performed by: OCCUPATIONAL THERAPIST

## 2024-05-28 PROCEDURE — 1200000000 HC SEMI PRIVATE

## 2024-05-28 PROCEDURE — 94640 AIRWAY INHALATION TREATMENT: CPT

## 2024-05-28 PROCEDURE — 94761 N-INVAS EAR/PLS OXIMETRY MLT: CPT

## 2024-05-28 PROCEDURE — 6370000000 HC RX 637 (ALT 250 FOR IP): Performed by: OCCUPATIONAL THERAPIST

## 2024-05-28 PROCEDURE — 6370000000 HC RX 637 (ALT 250 FOR IP): Performed by: SURGERY

## 2024-05-28 PROCEDURE — 72146 MRI CHEST SPINE W/O DYE: CPT

## 2024-05-28 PROCEDURE — 2580000003 HC RX 258: Performed by: SURGERY

## 2024-05-28 PROCEDURE — 94010 BREATHING CAPACITY TEST: CPT

## 2024-05-28 PROCEDURE — 97129 THER IVNTJ 1ST 15 MIN: CPT

## 2024-05-28 PROCEDURE — 94799 UNLISTED PULMONARY SVC/PX: CPT

## 2024-05-28 PROCEDURE — 72148 MRI LUMBAR SPINE W/O DYE: CPT

## 2024-05-28 PROCEDURE — 92523 SPEECH SOUND LANG COMPREHEN: CPT

## 2024-05-28 PROCEDURE — 99222 1ST HOSP IP/OBS MODERATE 55: CPT | Performed by: NEUROLOGICAL SURGERY

## 2024-05-28 RX ADMIN — HYDROMORPHONE HYDROCHLORIDE 1 MG: 1 INJECTION, SOLUTION INTRAMUSCULAR; INTRAVENOUS; SUBCUTANEOUS at 05:35

## 2024-05-28 RX ADMIN — CITALOPRAM 40 MG: 40 TABLET, FILM COATED ORAL at 08:07

## 2024-05-28 RX ADMIN — ACETAMINOPHEN 1000 MG: 500 TABLET ORAL at 12:31

## 2024-05-28 RX ADMIN — OXYCODONE HYDROCHLORIDE 10 MG: 5 TABLET ORAL at 08:12

## 2024-05-28 RX ADMIN — SODIUM CHLORIDE, PRESERVATIVE FREE 10 ML: 5 INJECTION INTRAVENOUS at 19:56

## 2024-05-28 RX ADMIN — METHOCARBAMOL TABLETS 1500 MG: 500 TABLET, COATED ORAL at 19:55

## 2024-05-28 RX ADMIN — IPRATROPIUM BROMIDE AND ALBUTEROL SULFATE 1 DOSE: .5; 3 SOLUTION RESPIRATORY (INHALATION) at 20:12

## 2024-05-28 RX ADMIN — FAMOTIDINE 20 MG: 20 TABLET, FILM COATED ORAL at 19:55

## 2024-05-28 RX ADMIN — OXYCODONE HYDROCHLORIDE 10 MG: 5 TABLET ORAL at 19:30

## 2024-05-28 RX ADMIN — METHOCARBAMOL TABLETS 1500 MG: 500 TABLET, COATED ORAL at 14:18

## 2024-05-28 RX ADMIN — BACITRACIN: 500 OINTMENT TOPICAL at 14:22

## 2024-05-28 RX ADMIN — METHOCARBAMOL TABLETS 1500 MG: 500 TABLET, COATED ORAL at 08:08

## 2024-05-28 RX ADMIN — IPRATROPIUM BROMIDE AND ALBUTEROL SULFATE 1 DOSE: .5; 3 SOLUTION RESPIRATORY (INHALATION) at 08:30

## 2024-05-28 RX ADMIN — CETIRIZINE HYDROCHLORIDE 5 MG: 5 TABLET ORAL at 08:08

## 2024-05-28 RX ADMIN — BUPROPION HYDROCHLORIDE 150 MG: 150 TABLET, EXTENDED RELEASE ORAL at 17:04

## 2024-05-28 RX ADMIN — BUPROPION HYDROCHLORIDE 300 MG: 300 TABLET, FILM COATED, EXTENDED RELEASE ORAL at 08:07

## 2024-05-28 RX ADMIN — SODIUM CHLORIDE, PRESERVATIVE FREE 10 ML: 5 INJECTION INTRAVENOUS at 08:04

## 2024-05-28 RX ADMIN — HYDROMORPHONE HYDROCHLORIDE 1 MG: 1 INJECTION, SOLUTION INTRAMUSCULAR; INTRAVENOUS; SUBCUTANEOUS at 12:30

## 2024-05-28 RX ADMIN — ACETAMINOPHEN 1000 MG: 500 TABLET ORAL at 17:02

## 2024-05-28 RX ADMIN — IPRATROPIUM BROMIDE AND ALBUTEROL SULFATE 1 DOSE: .5; 3 SOLUTION RESPIRATORY (INHALATION) at 13:45

## 2024-05-28 RX ADMIN — OXYCODONE HYDROCHLORIDE 10 MG: 5 TABLET ORAL at 14:18

## 2024-05-28 RX ADMIN — FAMOTIDINE 20 MG: 20 TABLET, FILM COATED ORAL at 08:12

## 2024-05-28 RX ADMIN — BACITRACIN: 500 OINTMENT TOPICAL at 08:05

## 2024-05-28 RX ADMIN — HYDROMORPHONE HYDROCHLORIDE 1 MG: 1 INJECTION, SOLUTION INTRAMUSCULAR; INTRAVENOUS; SUBCUTANEOUS at 15:02

## 2024-05-28 RX ADMIN — BACITRACIN: 500 OINTMENT TOPICAL at 19:56

## 2024-05-28 ASSESSMENT — PAIN - FUNCTIONAL ASSESSMENT
PAIN_FUNCTIONAL_ASSESSMENT: ACTIVITIES ARE NOT PREVENTED
PAIN_FUNCTIONAL_ASSESSMENT: PREVENTS OR INTERFERES SOME ACTIVE ACTIVITIES AND ADLS
PAIN_FUNCTIONAL_ASSESSMENT: ACTIVITIES ARE NOT PREVENTED

## 2024-05-28 ASSESSMENT — PAIN DESCRIPTION - LOCATION
LOCATION: BACK;FOOT
LOCATION: BACK

## 2024-05-28 ASSESSMENT — PAIN SCALES - GENERAL
PAINLEVEL_OUTOF10: 10
PAINLEVEL_OUTOF10: 8
PAINLEVEL_OUTOF10: 9
PAINLEVEL_OUTOF10: 8
PAINLEVEL_OUTOF10: 8
PAINLEVEL_OUTOF10: 9
PAINLEVEL_OUTOF10: 8
PAINLEVEL_OUTOF10: 8

## 2024-05-28 ASSESSMENT — PAIN DESCRIPTION - DESCRIPTORS
DESCRIPTORS: THROBBING
DESCRIPTORS: SORE
DESCRIPTORS: DISCOMFORT;ACHING;THROBBING
DESCRIPTORS: SHARP;BURNING
DESCRIPTORS: THROBBING

## 2024-05-28 ASSESSMENT — PAIN DESCRIPTION - ORIENTATION
ORIENTATION: OTHER (COMMENT)
ORIENTATION: RIGHT
ORIENTATION: RIGHT

## 2024-05-28 NOTE — PROGRESS NOTES
[facility]  Trauma Surgery - Dr. Carley Moya  Daily Progress Note    Pt Name: Luis Carlos Daigle III  Medical Record Number: [id number]  Date of Birth 1965   Today's Date: 5/28/2024    HD: # 2    CC: Right first metatarsal pain     ASSESSMENT  1.  Active Hospital Problems    Diagnosis Date Noted    Fracture of nasal bone [S02.2XXA] 05/26/2024    Closed fracture of transverse process of lumbar vertebra (HCC) [S32.009A] 05/26/2024    Metatarsal bone fracture [S92.309A] 05/26/2024    Scalp laceration [S01.01XA] 05/26/2024    Fracture of multiple ribs of left side [S22.42XA] 05/26/2024    Fall from roof, initial encounter [W13.2XXA] 05/26/2024         PLAN  Patient admitted under Trauma Services (w/ tele) to 7k     Trauma by fall (off roof 8-10 ft)   - Fall precautions    - PT/OT/SLP eval and treat      -5/28: SLP eval: MOCA 25/30, ACE 9/22 indicates mild cognitive impairment, recommend skilled ST services to address deficits    Left maxillary sinus fracture    - CT facial bones reads There is deformity of the anterior wall of the left maxillary sinus consistent with a fracture. There is slight irregularity along the lateral wall of the left orbit which may represent a nondisplaced fracture.   - Consult ENT, Dr Joseph       -No surgical indications for facial bones   - No nose blowing   - Given 1 time dosing 3000 mg Unasyn    - Pain control     Left 11-12 rib fractures/Hx of COPD   - CT chest reads Nondisplaced left 11th and 12th rib fractures are seen posteriorly.    - Rib fracture protocol   - Rpbaxin   - Pain control   - Lidoderm patches   - Incentive spirometry/cough and deep breathing   - Repeat chest x-ray in a.m.   - Monitor respiratory status    - 05/27: per RT a NIF of -35 cm H2O  and FVC of 1.62 liters, exceeding parameters. Cont with pain control. Repeat CXR    - prn duoneb breathing txs   - 5/28: NIF -30 CWP, FVC 1.18 L    L3 spine fracture    - CT's reads Minimally displaced fracture of  differentiation is preserved. There is deformity of the anterior wall of the left maxillary sinus and along the lateral wall of the left orbit. The remaining paranasal sinuses and mastoid air cells are normally aerated.  There is no suspicious calvarial abnormality.      1. Soft tissue swelling in the scalp overlying the left frontal and temporal calvarium and in the preseptal soft tissues over the left orbit. 2. Deformity of the anterior wall of the left maxillary sinus and lateral wall of the left orbit. 3. Otherwise negative noncontrast CT scan of the brain. **This report has been created using voice recognition software. It may contain minor errors which are inherent in voice recognition technology.**    XR FOOT RIGHT (MIN 3 VIEWS)    Result Date: 5/26/2024  PROCEDURE: XR FOOT RIGHT (MIN 3 VIEWS) CLINICAL INFORMATION: pain, trauma. COMPARISON: No prior study. TECHNIQUE: 4 views of the right foot. FINDINGS: There appears be a fracture at the base of the first metatarsal. The remaining bony structures are intact.. There is spurring at the attachment of the Achilles tendon upon the calcaneus. There is a tiny plantar spur.. The soft tissues are normal.     1. Fracture at the base of the first metatarsal. **This report has been created using voice recognition software. It may contain minor errors which are inherent in voice recognition technology.**     XR HAND RIGHT (MIN 3 VIEWS)    Result Date: 5/26/2024  PROCEDURE: XR HAND RIGHT (MIN 3 VIEWS) CLINICAL INFORMATION: middle finger pain . COMPARISON:  No prior study. TECHNIQUE:  3 views of the right hand. FINDINGS: There are no fractures. There is no dislocation.  The digits are normal. The metacarpals are normal.  The joint spaces are preserved. The soft tissues are normal.     No fracture. **This report has been created using voice recognition software. It may contain minor errors which are inherent in voice recognition technology.**      20 Minutes spent in patient

## 2024-05-28 NOTE — RT PROTOCOL NOTE
RT Inhaler-Nebulizer Bronchodilator Protocol Note    There is a bronchodilator order in the chart from a provider indicating to follow the RT Bronchodilator Protocol and there is an “Initiate RT Inhaler-Nebulizer Bronchodilator Protocol” order as well (see protocol at bottom of note).    CXR Findings:  XR CHEST PORTABLE    Result Date: 5/27/2024  No acute pleural or pulmonary parenchymal process. This document has been electronically signed by: Jessica Vitale DO on 05/27/2024 03:17 AM      The findings from the last RT Protocol Assessment were as follows:   History Pulmonary Disease: Chronic pulmonary disease  Respiratory Pattern: Regular pattern and RR 12-20 bpm  Breath Sounds: Inspiratory and expiratory or bilateral wheezing and/or rhonchi  Cough: Strong, spontaneous, non-productive  Indication for Bronchodilator Therapy: On home bronchodilators, Wheezing associated with pulm disorder  Bronchodilator Assessment Score: 8    Aerosolized bronchodilator medication orders have been revised according to the RT Inhaler-Nebulizer Bronchodilator Protocol below.    Respiratory Therapist to perform RT Therapy Protocol Assessment initially then follow the protocol.  Repeat RT Therapy Protocol Assessment PRN for score 0-3 or on second treatment, BID, and PRN for scores above 3.    No Indications - adjust the frequency to every 6 hours PRN wheezing or bronchospasm, if no treatments needed after 48 hours then discontinue using Per Protocol order mode.     If indication present, adjust the RT bronchodilator orders based on the Bronchodilator Assessment Score as indicated below.  Use Inhaler orders unless patient has one or more of the following: on home nebulizer, not able to hold breath for 10 seconds, is not alert and oriented, cannot activate and use MDI correctly, or respiratory rate 25 breaths per minute or more, then use the equivalent nebulizer order(s) with same Frequency and PRN reasons based on the score.  If a

## 2024-05-28 NOTE — PROGRESS NOTES
[facility]  PHYSICAL THERAPY MISSED TREATMENT NOTE  [facility] ORTHOPEDICS 7K    Date: 2024  Patient Name: Luis Carlos Daigle III        MRN: [id number]   : 1965  (58 y.o.)  Gender: male                REASON FOR MISSED TREATMENT:  Missed Treat.  Per neuro note, waiting on further imaging to determine POC for OOB tasks, will check back as able.

## 2024-05-28 NOTE — PROGRESS NOTES
Respiratory Care is following the rib fracture order set. Patient's position when testing was done was semi-fowlers.  A Negative Inspiratory Force (NIF) was performed with patient achieving a NIF of -30 cm H2O. The NIF was greater than 25 cm H2O. A Forced Vital Capacity (FVC) was obtained with patient achieving an FVC of 1.18 liters. The patient's calculated ideal body weight, (IBW) is  70.7 kg. 0.020 liters/kg of the patient's IBW is 1.62 liters. The patient's FVC was less than 0.020 liters/kg of IBW. Based on the spirometry measurement alone, patient does not meet ICU admission criteria. Previous FVC was 1.18 liters and previous NIF was -35 cm H2O.  Patient's NIF and FVC show no change from previous screening(s). Last pain medication was given on  5/28 @ 1230. Physician was not called regarding spirometry measurement.

## 2024-05-28 NOTE — PROGRESS NOTES
Neurosurgery Interim Progress Note    Patient:  Luis Carlos Daigle III      Unit/Bed:7K-13/013-A    YOB: 1965    MRN: [id number]     Acct: [id number]     Admit date: 5/26/2024    Chief Complaint   Patient presents with    Fall     8-10 feet     Head Injury       Patient Seen, Chart, Physician notes, Labs, Radiology studies reviewed.    Patient is seen and evaluated on the floor with evaluation and exam findings reviewed and discussed with Dr. Kelly/neurosurgeon and with nursing.  Patient was resting comfortably having been admitted through the emergency department following a mechanical fall injury resulting in multiple rib fractures along with L3 transverse process fracture evidenced on CT scan at admission.  On examination this morning the patient is verbal and appropriate with significant positional pain noted, likely secondary to multiple rib fractures and spinous process fracture.  Patient rates his pain at an 8 out of 10.  He was otherwise intact, absent significant neurodeficits with additional imaging pending.      Patient recommendation and treatment plan from a neurosurgical perspective at this time:    Thoracic and lumbar MRIs have been ordered to rule out additional acute fractures and ligamentous injury    A detailed neurosurgical note to follow    Electronically signed by Lalo Almazan PA-C on 5/28/2024 at 6:33 AM

## 2024-05-28 NOTE — PROGRESS NOTES
[facility]  SPEECH THERAPY  [facility] 7K  Speech - Language - Cognitive Evaluation + cognitive treatment    SLP Individual Minutes  Time In: 1031  Time Out: 1057  Minutes: 26  Timed Code Treatment Minutes: 8 Minutes  Cognitive Evaluation: 18  Cognitive treatment: 8 minutes     Date: 2024  Patient Name: Luis Carlos Daigle III      CSN: [id number]   : 1965  (58 y.o.)  Gender: male   Referring Physician:  Carley Moya MD   Diagnosis: fall from roof, initial encounter  Precautions: fall precautions  History of Present Illness/Injury:  57 yo male fell while cleaning brush off of roof presents with large laceration over scalp and pain to right foot. Denies any LOC, was approximatley 8-10 feet off the ground. Complains of pain on right foot and left head and face   Past Medical History:   Diagnosis Date    Abdominal pain     Acute pharyngitis     Anxiety     Webb's esophagus     Chronic laryngitis     Constipation     COPD (chronic obstructive pulmonary disease) (HCC)     Depression     Fatigue     GERD (gastroesophageal reflux disease)     Hemorrhoids, internal, with bleeding     HEP C W/ COMA, ACUTE/NOS     Hyperlipidemia     Hypertension     IBS (irritable bowel syndrome)     Rectal bleeding     Shortness of breath     Weight loss      ST consulted to complete cognitive evaluation and further develop POC.     Pain: 8/10 - Pain location: back/foot    Subjective:  RN Lorrie approved ST evaluation this date.  Patient resting in bed upon ST entry.  Patient awake, alert and agreeable to ST Services this date. No family present at bedside.     SOCIAL HISTORY:   Living Arrangements: Home independently  Work History:  Construction  Education Level: high school diploma  Driving Status:  Active  but reports not having a license  Finance Management: Independent  Medication Management: Independent  ADL's: Independent.   Hobbies: phone, TV  Vision Status: wears glasses, not  Recommendations: Continue to Assess Pending Progress    EDUCATION:  Learner: Patient  Education:  Reviewed results and recommendations of this evaluation, Reviewed ST goals and Plan of Care, and Reviewed recommendations for follow-up  Evaluation of Education: Verbalizes understanding and Family not present    PLAN:  Skilled SLP intervention on acute care 3-5 x per week or until goals met and/or patient plateaus in function.  Specific interventions for next session may include: executive function, attention.    PATIENT GOAL:    Return to prior level of function.    ST treatment    SHORT TERM GOALS:  Short Term Goals  Goal 1: Patient will complete immediate/delayed recall and working memory tasks with 80% accuracy with min cues to improve retention of functional information.  INTERVENTIONS: Goal not formally addressed this date due to a focus on other objectives.     Goal 2: Patient will complete executive function (meds, finances, vocation) with 80% accuracy with min cues to promote safe returnt o PLOF.  INTERVENTIONS: Goal not formally addressed this date due to a focus on other objectives.     Goal 3: Patient will complete attention tasks (sustained, alternative, divided) with no more thana 3 errors to improve attention to participate in IADL completion.  INTERVENTIONS: Goal not formally addressed this date due to a focus on other objectives.     Goal 4: Patient will complete ACE with score of less than 12/22 for 3 consecutive days.  INTERVENTIONS:   Although patient scored a 10/22 low stimulation guidelines were reviewed with the patient.  Discussed how the brain is processing information all the time and that after a brain injury this can be more taxing than usual and can lead to increased symptoms.  Discussed with the patient that if he begins experiencing more symptoms, he can utilize low stimulation precautions to assist with symptom management.  Patient verbalized understanding of recommendations    Low

## 2024-05-28 NOTE — PLAN OF CARE
Problem: Discharge Planning  Goal: Discharge to home or other facility with appropriate resources  5/28/2024 1710 by Lorrie Castillo RN  Outcome: Progressing     Problem: Pain  Goal: Verbalizes/displays adequate comfort level or baseline comfort level  5/28/2024 1710 by Lorrie Castillo RN  Outcome: Progressing     Problem: Safety - Adult  Goal: Free from fall injury  5/28/2024 1710 by Lorrie Castillo RN  Outcome: Progressing     Problem: ABCDS Injury Assessment  Goal: Absence of physical injury  5/28/2024 1710 by Lorrie Castillo, RN  Outcome: Progressing

## 2024-05-28 NOTE — PLAN OF CARE
Problem: Respiratory - Adult  Goal: Lung sounds clear or within normal limits for patient  Outcome: Progressing   Breath sounds expiratory wheezing throughout, continuing duoneb per protocol.  Patient mutually agreed on goals.

## 2024-05-28 NOTE — PROGRESS NOTES
[facility]  OCCUPATIONAL THERAPY MISSED TREATMENT NOTE  [facility] ORTHOPEDICS 7K  7K-13/013-A      Date: 2024  Patient Name: Luis Carlos Daigle III        CSN: [id number]   : 1965  (58 y.o.)  Gender: male                REASON FOR MISSED TREATMENT:  Transport is here to take patient to MRI. OT will hold until MRI resulted and Neuro clears patient to be OOB.

## 2024-05-29 PROCEDURE — 94799 UNLISTED PULMONARY SVC/PX: CPT

## 2024-05-29 PROCEDURE — 94010 BREATHING CAPACITY TEST: CPT

## 2024-05-29 PROCEDURE — 6370000000 HC RX 637 (ALT 250 FOR IP): Performed by: OCCUPATIONAL THERAPIST

## 2024-05-29 PROCEDURE — 2580000003 HC RX 258: Performed by: SURGERY

## 2024-05-29 PROCEDURE — 94640 AIRWAY INHALATION TREATMENT: CPT

## 2024-05-29 PROCEDURE — 97166 OT EVAL MOD COMPLEX 45 MIN: CPT

## 2024-05-29 PROCEDURE — 94761 N-INVAS EAR/PLS OXIMETRY MLT: CPT

## 2024-05-29 PROCEDURE — 97162 PT EVAL MOD COMPLEX 30 MIN: CPT

## 2024-05-29 PROCEDURE — 1200000000 HC SEMI PRIVATE

## 2024-05-29 PROCEDURE — 97116 GAIT TRAINING THERAPY: CPT

## 2024-05-29 PROCEDURE — 97129 THER IVNTJ 1ST 15 MIN: CPT

## 2024-05-29 PROCEDURE — 94669 MECHANICAL CHEST WALL OSCILL: CPT

## 2024-05-29 PROCEDURE — 97130 THER IVNTJ EA ADDL 15 MIN: CPT

## 2024-05-29 PROCEDURE — 99232 SBSQ HOSP IP/OBS MODERATE 35: CPT | Performed by: SURGERY

## 2024-05-29 PROCEDURE — 97530 THERAPEUTIC ACTIVITIES: CPT

## 2024-05-29 PROCEDURE — 6370000000 HC RX 637 (ALT 250 FOR IP): Performed by: SURGERY

## 2024-05-29 RX ADMIN — METHOCARBAMOL TABLETS 1500 MG: 500 TABLET, COATED ORAL at 14:04

## 2024-05-29 RX ADMIN — SODIUM CHLORIDE, PRESERVATIVE FREE 10 ML: 5 INJECTION INTRAVENOUS at 20:12

## 2024-05-29 RX ADMIN — METHOCARBAMOL TABLETS 1500 MG: 500 TABLET, COATED ORAL at 10:30

## 2024-05-29 RX ADMIN — ACETAMINOPHEN 1000 MG: 500 TABLET ORAL at 14:04

## 2024-05-29 RX ADMIN — BACITRACIN: 500 OINTMENT TOPICAL at 14:26

## 2024-05-29 RX ADMIN — IPRATROPIUM BROMIDE AND ALBUTEROL SULFATE 1 DOSE: .5; 3 SOLUTION RESPIRATORY (INHALATION) at 19:45

## 2024-05-29 RX ADMIN — OXYCODONE HYDROCHLORIDE 10 MG: 5 TABLET ORAL at 06:50

## 2024-05-29 RX ADMIN — OXYCODONE HYDROCHLORIDE 10 MG: 5 TABLET ORAL at 01:33

## 2024-05-29 RX ADMIN — METHOCARBAMOL TABLETS 1500 MG: 500 TABLET, COATED ORAL at 20:08

## 2024-05-29 RX ADMIN — BUPROPION HYDROCHLORIDE 150 MG: 150 TABLET, EXTENDED RELEASE ORAL at 16:53

## 2024-05-29 RX ADMIN — BUPROPION HYDROCHLORIDE 300 MG: 300 TABLET, FILM COATED, EXTENDED RELEASE ORAL at 10:30

## 2024-05-29 RX ADMIN — ACETAMINOPHEN 1000 MG: 500 TABLET ORAL at 20:08

## 2024-05-29 RX ADMIN — IPRATROPIUM BROMIDE AND ALBUTEROL SULFATE 1 DOSE: .5; 3 SOLUTION RESPIRATORY (INHALATION) at 06:34

## 2024-05-29 RX ADMIN — IPRATROPIUM BROMIDE AND ALBUTEROL SULFATE 1 DOSE: .5; 3 SOLUTION RESPIRATORY (INHALATION) at 13:06

## 2024-05-29 RX ADMIN — OXYCODONE HYDROCHLORIDE 10 MG: 5 TABLET ORAL at 10:30

## 2024-05-29 RX ADMIN — CITALOPRAM 40 MG: 40 TABLET, FILM COATED ORAL at 10:30

## 2024-05-29 RX ADMIN — BACITRACIN: 500 OINTMENT TOPICAL at 10:29

## 2024-05-29 RX ADMIN — OXYCODONE HYDROCHLORIDE 10 MG: 5 TABLET ORAL at 16:53

## 2024-05-29 RX ADMIN — CETIRIZINE HYDROCHLORIDE 5 MG: 5 TABLET ORAL at 10:30

## 2024-05-29 RX ADMIN — SODIUM CHLORIDE, PRESERVATIVE FREE 10 ML: 5 INJECTION INTRAVENOUS at 10:29

## 2024-05-29 RX ADMIN — FAMOTIDINE 20 MG: 20 TABLET, FILM COATED ORAL at 10:29

## 2024-05-29 RX ADMIN — FAMOTIDINE 20 MG: 20 TABLET, FILM COATED ORAL at 20:08

## 2024-05-29 RX ADMIN — BACITRACIN: 500 OINTMENT TOPICAL at 20:08

## 2024-05-29 ASSESSMENT — ENCOUNTER SYMPTOMS
CHEST TIGHTNESS: 0
BACK PAIN: 1
ABDOMINAL PAIN: 0

## 2024-05-29 ASSESSMENT — PAIN SCALES - GENERAL
PAINLEVEL_OUTOF10: 7
PAINLEVEL_OUTOF10: 9
PAINLEVEL_OUTOF10: 5
PAINLEVEL_OUTOF10: 8
PAINLEVEL_OUTOF10: 6
PAINLEVEL_OUTOF10: 8
PAINLEVEL_OUTOF10: 9
PAINLEVEL_OUTOF10: 8
PAINLEVEL_OUTOF10: 5
PAINLEVEL_OUTOF10: 7
PAINLEVEL_OUTOF10: 6
PAINLEVEL_OUTOF10: 7

## 2024-05-29 ASSESSMENT — PAIN DESCRIPTION - LOCATION: LOCATION: BACK

## 2024-05-29 ASSESSMENT — PAIN - FUNCTIONAL ASSESSMENT
PAIN_FUNCTIONAL_ASSESSMENT: 0-10
PAIN_FUNCTIONAL_ASSESSMENT: ACTIVITIES ARE NOT PREVENTED

## 2024-05-29 ASSESSMENT — PAIN DESCRIPTION - DESCRIPTORS
DESCRIPTORS: THROBBING;ACHING
DESCRIPTORS: DISCOMFORT;ACHING

## 2024-05-29 ASSESSMENT — PAIN DESCRIPTION - ORIENTATION: ORIENTATION: RIGHT

## 2024-05-29 NOTE — RT PROTOCOL NOTE
RT Inhaler-Nebulizer Bronchodilator Protocol Note    There is a bronchodilator order in the chart from a provider indicating to follow the RT Bronchodilator Protocol and there is an “Initiate RT Inhaler-Nebulizer Bronchodilator Protocol” order as well (see protocol at bottom of note).    CXR Findings:  No results found.    The findings from the last RT Protocol Assessment were as follows:   History Pulmonary Disease: Chronic pulmonary disease  Respiratory Pattern: Regular pattern and RR 12-20 bpm  Breath Sounds: Slightly diminished and/or crackles  Cough: Strong, spontaneous, non-productive  Indication for Bronchodilator Therapy: On home bronchodilators, Wheezing associated with pulm disorder  Bronchodilator Assessment Score: 4    Aerosolized bronchodilator medication orders have been revised according to the RT Inhaler-Nebulizer Bronchodilator Protocol below.    Respiratory Therapist to perform RT Therapy Protocol Assessment initially then follow the protocol.  Repeat RT Therapy Protocol Assessment PRN for score 0-3 or on second treatment, BID, and PRN for scores above 3.    No Indications - adjust the frequency to every 6 hours PRN wheezing or bronchospasm, if no treatments needed after 48 hours then discontinue using Per Protocol order mode.     If indication present, adjust the RT bronchodilator orders based on the Bronchodilator Assessment Score as indicated below.  Use Inhaler orders unless patient has one or more of the following: on home nebulizer, not able to hold breath for 10 seconds, is not alert and oriented, cannot activate and use MDI correctly, or respiratory rate 25 breaths per minute or more, then use the equivalent nebulizer order(s) with same Frequency and PRN reasons based on the score.  If a patient is on this medication at home then do not decrease Frequency below that used at home.    0-3 - enter or revise RT bronchodilator order(s) to equivalent RT Bronchodilator order with Frequency of  every 4 hours PRN for wheezing or increased work of breathing using Per Protocol order mode.        4-6 - enter or revise RT Bronchodilator order(s) to two equivalent RT bronchodilator orders with one order with BID Frequency and one order with Frequency of every 4 hours PRN wheezing or increased work of breathing using Per Protocol order mode.        7-10 - enter or revise RT Bronchodilator order(s) to two equivalent RT bronchodilator orders with one order with TID Frequency and one order with Frequency of every 4 hours PRN wheezing or increased work of breathing using Per Protocol order mode.       11-13 - enter or revise RT Bronchodilator order(s) to one equivalent RT bronchodilator order with QID Frequency and an Albuterol order with Frequency of every 4 hours PRN wheezing or increased work of breathing using Per Protocol order mode.      Greater than 13 - enter or revise RT Bronchodilator order(s) to one equivalent RT bronchodilator order with every 4 hours Frequency and an Albuterol order with Frequency of every 2 hours PRN wheezing or increased work of breathing using Per Protocol order mode.     RT to enter RT Home Evaluation for COPD & MDI Assessment order using Per Protocol order mode.    Electronically signed by Gayle Mchugh RCP on 5/29/2024 at 7:50 PM

## 2024-05-29 NOTE — CONSULTS
[place], [place]                                          NEUROSURGICAL CONSULTATION NOTE       Luis Carlos Daigle III   YOB: 1965  Account Number: [id number]   Date of Examination: 5/28/2024    ASSESSMENT:    -This is a 58-year-old male  s/p fall a roof  who  underwent spine CTs that showed nondisplaced transverse fracture at the level of L3.  -GCS: 15/15  -Focal neurological deficit: Patient complaining from severe back pain but there is no focal neurodeficit at this time.  -Patient also has rib fractures.     PLAN:     Medical management per patient's primary.  Pain control.  Pain medicine consultation can be considered to help in patient pain control.  PT and OT as tolerated.  Treatment of other injuries are per the corresponding services  There is no indication for any acute neurosurgical intervention at this time ) patient spine fractures is stable fracture and all it needs  is pain control).  From this time on, neurosurgery team will see this patient only as needed as long as he is in the hospital. Please call if you have any further questions or concerns regarding this patient.   I discussed the case in detail with patient and with the trauma team.  All questions and concerns    HISTORY OF PRESENT ILLNESS:  Luis Carlos Daigle III is a 58 y.o. male, admitted on :5/26/2024  1:38 PM  This is an 85-year-old male who brought to the ER for evaluation of injuries sustained after the patient had a fall off of a roof.  There is na history of loss of consciousness there is no history of new focal neurodeficit.  After the fall patient started to complain from severe back pain and chest pain.  Patient was found to have rib fractures and fractures at the level of L3.    ROS:    Review of Systems   Constitutional:  Negative for fever.   HENT:  Negative for congestion.    Eyes:  Negative for visual disturbance.   Respiratory:  Negative for chest tightness.    Cardiovascular:  Positive for

## 2024-05-29 NOTE — PROGRESS NOTES
biliary tree, pancreas, and kidneys are unremarkable. No bowel obstruction or acute inflammatory bowel process. The appendix is unremarkable. The abdominal aorta is not aneurysmal. No significantly enlarged lymph nodes are seen. The bladder is grossly unremarkable. The prostate is not enlarged. A small fat-containing right inguinal hernia. Bones: Degenerative changes of the visualized thoracolumbar spine. Degenerative changes of the SI joints and both hips. CT LUMBAR SPINE: ALIGNMENT: The lumbar lordosis is maintained. FRACTURE: 1. Minimally displaced fracture of the left transverse process of L3 DISC SPACES: The lack of intrathecal contrast limits the evaluation of the spinal canal. No significant central canal narrowing. No significant neural foraminal narrowing.     1. Minimally displaced fracture of the left transverse process of L3. 2. Bilateral adrenal hyperplasia. 3. Mild hepatic steatosis. 4. Other findings as described above. **This report has been created using voice recognition software.  It may contain minor errors which are inherent in voice recognition technology.**    CT cervical spine without contrast    Result Date: 5/26/2024  PROCEDURE: CT CERVICAL SPINE WO CONTRAST CLINICAL INFORMATION: trauma. COMPARISON: No prior study. TECHNIQUE: 3 mm noncontrast axial images were obtained through the cervical spine with sagittal and coronal reconstructions. All CT scans at this facility use dose modulation, iterative reconstruction, and/or weight-based dosing when appropriate to reduce radiation dose to as low as reasonably achievable. FINDINGS: There is straightening of the normal cervical lordosis..  There is no fracture. There is no prevertebral soft tissue swelling. There are degenerative changes at C4-5, C5-6 and C6-7.. No suspicious osseous lesions are present. At C1-2, there is normal alignment of the dens relative to anterior arch of C1. At C2-3, C3-4 and C4-5, there is no disc herniation, canal or  appears be a fracture at the base of the first metatarsal. The remaining bony structures are intact.. There is spurring at the attachment of the Achilles tendon upon the calcaneus. There is a tiny plantar spur.. The soft tissues are normal.     1. Fracture at the base of the first metatarsal. **This report has been created using voice recognition software. It may contain minor errors which are inherent in voice recognition technology.**     XR HAND RIGHT (MIN 3 VIEWS)    Result Date: 5/26/2024  PROCEDURE: XR HAND RIGHT (MIN 3 VIEWS) CLINICAL INFORMATION: middle finger pain . COMPARISON:  No prior study. TECHNIQUE:  3 views of the right hand. FINDINGS: There are no fractures. There is no dislocation.  The digits are normal. The metacarpals are normal.  The joint spaces are preserved. The soft tissues are normal.     No fracture. **This report has been created using voice recognition software. It may contain minor errors which are inherent in voice recognition technology.**    15 Minutes spent in patient care collectively between subjective/objective examination, chart review, documentation, clinical reasoning and discussion with attending regarding plan/interval changes.    Electronically signed by ZOHREH Xie CNP on 5/29/2024 at 2:28 PM

## 2024-05-29 NOTE — PROGRESS NOTES
deficits / Impairments: Decreased functional mobility , Decreased endurance, Decreased ADL status, Decreased strength, Decreased safe awareness, Decreased high-level IADLs, Decreased balance  Prognosis: Good  REQUIRES OT FOLLOW-UP: Yes  Decision Making: Medium Complexity    Treatment Initiated: Treatment and education initiated within context of evaluation.  Evaluation time included review of current medical information, gathering information related to past medical, social and functional history, completion of standardized testing, formal and informal observation of tasks, assessment of data and development of plan of care and goals.  Treatment time included skilled education and facilitation of tasks to increase safety and independence with ADL's for improved functional independence and quality of life.    Discharge Recommendations:  Continue to assess pending progress, Patient would benefit from continued therapy after discharge, Home with assist PRN    Patient Education:          Patient Education  Education Given To: Patient  Education Provided: Role of Therapy;Plan of Care;Precautions;ADL Adaptive Strategies;Transfer Training  Education Method: Demonstration;Verbal  Education Outcome: Continued education needed    Equipment Recommendations:  Equipment Needed: No    Plan:  Times Per Week: 5x  Times Per Day: Once a day  Current Treatment Recommendations: Balance training, Functional mobility training, Endurance training, Safety education & training, Patient/Caregiver education & training, Equipment evaluation, education, & procurement, Self-Care / ADL, ROM.  See long-term goal time frame for expected duration of plan of care.  If no long-term goals established, a short length of stay is anticipated.    Goals:  Patient goals : Go Home  Short Term Goals  Time Frame for Short Term Goals: Until discharge  Short Term Goal 1: Pt will complete LB dressing with SBA while maintaining precautions to increase indep with  don/doff CAM Boot.  Short Term Goal 2: Pt will complete functional mobility HH distances with Mod Indep to increase indep with all toileting.  Short Term Goal 3: Pt will complete bathing task with SBA and min vcs for safety to increase occupational preformance in home environment.  Short Term Goal 4: Pt will complete simple IADL with SBA and min vcs for safety to increase indep with simple meal prep with walker.  Additional Goals?: No    AM-PAC Inpatient Daily Activity Raw Score: 16  AM-PAC Inpatient ADL T-Scale Score : 35.96    Following session, patient left in safe position with all fall risk precautions in place.

## 2024-05-29 NOTE — RT PROTOCOL NOTE
RT Inhaler-Nebulizer Bronchodilator Protocol Note    There is a bronchodilator order in the chart from a provider indicating to follow the RT Bronchodilator Protocol and there is an “Initiate RT Inhaler-Nebulizer Bronchodilator Protocol” order as well (see protocol at bottom of note).    CXR Findings:  No results found.    The findings from the last RT Protocol Assessment were as follows:   History Pulmonary Disease: Chronic pulmonary disease  Respiratory Pattern: Regular pattern and RR 12-20 bpm  Breath Sounds: Inspiratory and expiratory or bilateral wheezing and/or rhonchi  Cough: Strong, spontaneous, non-productive  Indication for Bronchodilator Therapy: On home bronchodilators, Wheezing associated with pulm disorder  Bronchodilator Assessment Score: 8    Aerosolized bronchodilator medication orders have been revised according to the RT Inhaler-Nebulizer Bronchodilator Protocol below.    Respiratory Therapist to perform RT Therapy Protocol Assessment initially then follow the protocol.  Repeat RT Therapy Protocol Assessment PRN for score 0-3 or on second treatment, BID, and PRN for scores above 3.    No Indications - adjust the frequency to every 6 hours PRN wheezing or bronchospasm, if no treatments needed after 48 hours then discontinue using Per Protocol order mode.     If indication present, adjust the RT bronchodilator orders based on the Bronchodilator Assessment Score as indicated below.  Use Inhaler orders unless patient has one or more of the following: on home nebulizer, not able to hold breath for 10 seconds, is not alert and oriented, cannot activate and use MDI correctly, or respiratory rate 25 breaths per minute or more, then use the equivalent nebulizer order(s) with same Frequency and PRN reasons based on the score.  If a patient is on this medication at home then do not decrease Frequency below that used at home.    0-3 - enter or revise RT bronchodilator order(s) to equivalent RT Bronchodilator  order with Frequency of every 4 hours PRN for wheezing or increased work of breathing using Per Protocol order mode.        4-6 - enter or revise RT Bronchodilator order(s) to two equivalent RT bronchodilator orders with one order with BID Frequency and one order with Frequency of every 4 hours PRN wheezing or increased work of breathing using Per Protocol order mode.        7-10 - enter or revise RT Bronchodilator order(s) to two equivalent RT bronchodilator orders with one order with TID Frequency and one order with Frequency of every 4 hours PRN wheezing or increased work of breathing using Per Protocol order mode.       11-13 - enter or revise RT Bronchodilator order(s) to one equivalent RT bronchodilator order with QID Frequency and an Albuterol order with Frequency of every 4 hours PRN wheezing or increased work of breathing using Per Protocol order mode.      Greater than 13 - enter or revise RT Bronchodilator order(s) to one equivalent RT bronchodilator order with every 4 hours Frequency and an Albuterol order with Frequency of every 2 hours PRN wheezing or increased work of breathing using Per Protocol order mode.     RT to enter RT Home Evaluation for COPD & MDI Assessment order using Per Protocol order mode.    Electronically signed by Siva Bray RCP on 5/29/2024 at 6:38 AM

## 2024-05-29 NOTE — PROGRESS NOTES
Respiratory Care is following the rib fracture order set. Patient's position when testing was done was low fowlers.  A Negative Inspiratory Force (NIF) was performed with patient achieving a NIF of >-40 cm H2O. The NIF was greater than 25 cm H2O. A Forced Vital Capacity (FVC) was obtained with patient achieving an FVC of 1.93 liters. The patient's calculated ideal body weight, (IBW) is  70.7 kg. 0.020 liters/kg of the patient's IBW is 1.41 liters. The patient's FVC was greater than 0.020 liters/kg of IBW. Based on the spirometry measurement alone, patient does not meet ICU admission criteria. Previous FVC was 1.18 liters and previous NIF was -30 cm H2O.  Patient's NIF and FVC are improving from previous screening(s). Last pain medication was given on  5/29 @ 0650. Physician was not called regarding spirometry measurement.

## 2024-05-29 NOTE — PROGRESS NOTES
[facility]  INPATIENT SPEECH THERAPY  [facility] 7K  DAILY NOTE    TIME   SLP Individual Minutes  Time In: 934  Time Out: 958  Minutes: 24  Timed Code Treatment Minutes: 24 Minutes       Date: 2024  Patient Name: Luis Carlos Daigle III      CSN: [id number]   : 1965  (58 y.o.)  Gender: male   Referring Physician:   Carley Moya MD   Diagnosis: fall from roof, initial encounter   Precautions: fall precautions  Current Diet: Regular, thin  Respiratory Status: Room Air  Swallowing Strategies: Standard Universal Swallow Precautions  Date of Last MBS/FEES: Not Applicable    Pain:  9/10 - Pain location: back/foot    Subjective:  FARHAT Black approved ST session this date.  Patient resting in bed upon  entry.  Patient agreeable to  services.     Short-Term Goals:  SHORT TERM GOAL #1:  Goal 1: Patient will complete immediate/delayed recall and working memory tasks with 80% accuracy with min cues to improve retention of functional information.  INTERVENTIONS:  Paragraph recall- 6 units  Immediate recall: 3/6 independent, 1/6 with multiple choice  Delayed recall 3/6 independent, 2/6 multiple choice cues    SHORT TERM GOAL #2:  Goal 2: Patient will complete executive function (meds, finances, vocation) with 80% accuracy with min cues to promote safe returnt o PLOF.  INTERVENTIONS:  Time - Word Problems  100% independently    SHORT TERM GOAL #3:  Goal 3: Patient will complete attention tasks (sustained, alternative, divided) with no more thana 3 errors to improve attention to participate in IADL completion.  INTERVENTIONS:Patient with functional attention for trialed therapy tasks this date.     SHORT TERM GOAL #4:  Goal 4: Patient will complete ACE with score of less than 12/22 for 3 consecutive days.  INTERVENTIONS:    Acute Concussion Evaluation (ACE):   Physical Symptoms: 5/10  Cognitive Symptoms: 0/4  Emotional Symptoms: 0/4  Sleep Symptoms: 0/4      ACE score of 5/22 calculated; It

## 2024-05-29 NOTE — PROGRESS NOTES
prior to session    Vitals: Nurse checked vitals prior to session    Social/Functional History:    Lives With: Alone  Type of Home: House  Home Layout: One level  Home Access: Stairs to enter without rails  Entrance Stairs - Number of Steps: 2 BIA with a step into living room             ADL Assistance: Independent  Homemaking Assistance: Independent  Ambulation Assistance: Independent  Transfer Assistance: Independent    Active : Yes  Occupation: Full time employment  Type of Occupation: \"works on houses\"  Additional Comments: IND, use of no AD, active    OBJECTIVE:  Range of Motion:  Bilateral Lower Extremity: WFL    Strength:  Bilateral Lower Extremity: Impaired - grossly deconditioned    Balance:  Static Sitting Balance:  Supervision  Dynamic Sitting Balance: Supervision, Stand By Assistance  Static Standing Balance: Stand By Assistance, Contact Guard Assistance  Pt sitting on EOB. Dressing completed to R shin with assist to paresh boot, pt did not want to try. PT provided education on how to paresh/doff. Pt instructed to paresh L shoe for comfort, set up completed. Pt standing with RW for support, no LOB  Bed Mobility:  Rolling to Right: Contact Guard Assistance, X 1, with head of bed flat, with rail, with verbal cues    Supine to Sit: Stand By Assistance, X 1, with head of bed flat, with rail, with verbal cues   Educated on log roll technique. Socks donned prior to mobility by PT.   Transfers:  Sit to Stand: Contact Guard Assistance, X 1, cues for hand placement, with verbal cues  Stand to Sit:Contact Guard Assistance, X 1, cues for hand placement, with verbal cues  RW for support, cues for safe sequencing as pt not safely in front of chair prior to sitting.   Ambulation:  Contact Guard Assistance, X 1, with cues for safety, with verbal cues , with increased time for completion  Distance: 50 feet  Surface: Level Tile  Device:Rolling Walker  Gait Deviations:  Forward Flexed Posture, Slow Kimberley, Decreased Step  Education  Education Given To: Patient  Education Provided: Role of Therapy, Plan of Care, Transfer Training, Fall Prevention Strategies, Equipment, Precautions  Education Method: Verbal  Barriers to Learning: Cognition  Education Outcome: Verbalized understanding, Demonstrated understanding, Continued education needed       Equipment Recommendations:  Equipment Needed: Yes  Mobility Devices: Walker  Walker: Rolling    Plan:  Current Treatment Recommendations: Strengthening, Balance training, Functional mobility training, Transfer training, Gait training, Safety education & training, Home exercise program, Equipment evaluation, education, & procurement, Therapeutic activities, Patient/Caregiver education & training, Neuromuscular re-education, Stair training, Endurance training  General Plan:  (5x T)    Goals:  Patient Goals : return home alone  Short Term Goals  Time Frame for Short Term Goals: by discharge  Short Term Goal 1: Pt will demo IND with bed mobility tasks with good demo of log roll technique to return home safely.  Short Term Goal 2: Pt will demo sit to/from stand transfers with IND with RW to return home safely.  Short Term Goal 3: Pt will demo IND with gait for >150 feet to return home safely.  Short Term Goal 4: Pt will demo IND with stair negotiation with LRAD and safe technique to return home safely.  Long Term Goals  Time Frame for Long Term Goals : NA due to short ELOS    Following session, patient left in safe position with all fall risk precautions in place. Pt in bedside chair following session, all needs and call light in reach, alarm on.

## 2024-05-30 VITALS
HEART RATE: 88 BPM | OXYGEN SATURATION: 97 % | BODY MASS INDEX: 34.85 KG/M2 | DIASTOLIC BLOOD PRESSURE: 82 MMHG | SYSTOLIC BLOOD PRESSURE: 121 MMHG | RESPIRATION RATE: 18 BRPM | TEMPERATURE: 97.9 F | WEIGHT: 236 LBS

## 2024-05-30 PROCEDURE — 97535 SELF CARE MNGMENT TRAINING: CPT

## 2024-05-30 PROCEDURE — 94640 AIRWAY INHALATION TREATMENT: CPT

## 2024-05-30 PROCEDURE — 6370000000 HC RX 637 (ALT 250 FOR IP): Performed by: SURGERY

## 2024-05-30 PROCEDURE — 2580000003 HC RX 258: Performed by: SURGERY

## 2024-05-30 PROCEDURE — 97116 GAIT TRAINING THERAPY: CPT

## 2024-05-30 PROCEDURE — 94669 MECHANICAL CHEST WALL OSCILL: CPT

## 2024-05-30 PROCEDURE — 97530 THERAPEUTIC ACTIVITIES: CPT

## 2024-05-30 PROCEDURE — 6370000000 HC RX 637 (ALT 250 FOR IP): Performed by: OCCUPATIONAL THERAPIST

## 2024-05-30 PROCEDURE — 99231 SBSQ HOSP IP/OBS SF/LOW 25: CPT | Performed by: SURGERY

## 2024-05-30 RX ORDER — IPRATROPIUM BROMIDE AND ALBUTEROL SULFATE 2.5; .5 MG/3ML; MG/3ML
3 SOLUTION RESPIRATORY (INHALATION)
Qty: 360 ML | Refills: 0 | Status: SHIPPED | OUTPATIENT
Start: 2024-05-30

## 2024-05-30 RX ORDER — POLYETHYLENE GLYCOL 3350 17 G/17G
17 POWDER, FOR SOLUTION ORAL DAILY
Qty: 30 PACKET | Refills: 0 | Status: SHIPPED | OUTPATIENT
Start: 2024-05-31 | End: 2024-06-30

## 2024-05-30 RX ORDER — METHOCARBAMOL 750 MG/1
1500 TABLET, FILM COATED ORAL 3 TIMES DAILY
Qty: 180 TABLET | Refills: 1 | Status: SHIPPED | OUTPATIENT
Start: 2024-05-30 | End: 2024-07-29

## 2024-05-30 RX ORDER — GINSENG 100 MG
CAPSULE ORAL
Qty: 453.9 G | Refills: 3 | Status: SHIPPED | OUTPATIENT
Start: 2024-05-30 | End: 2024-06-09

## 2024-05-30 RX ORDER — OXYCODONE HYDROCHLORIDE 5 MG/1
5 TABLET ORAL EVERY 4 HOURS PRN
Qty: 30 TABLET | Refills: 0 | Status: SHIPPED | OUTPATIENT
Start: 2024-05-30 | End: 2024-06-04

## 2024-05-30 RX ORDER — LIDOCAINE 4 G/G
1 PATCH TOPICAL DAILY
Qty: 15 EACH | Refills: 2 | Status: SHIPPED | OUTPATIENT
Start: 2024-05-31

## 2024-05-30 RX ORDER — ACETAMINOPHEN 500 MG
1000 TABLET ORAL EVERY 6 HOURS PRN
Qty: 120 TABLET | Refills: 0 | Status: SHIPPED | OUTPATIENT
Start: 2024-05-30

## 2024-05-30 RX ORDER — IPRATROPIUM BROMIDE AND ALBUTEROL SULFATE 2.5; .5 MG/3ML; MG/3ML
3 SOLUTION RESPIRATORY (INHALATION) EVERY 4 HOURS PRN
Qty: 360 ML | Refills: 0 | Status: SHIPPED | OUTPATIENT
Start: 2024-05-30

## 2024-05-30 RX ADMIN — OXYCODONE HYDROCHLORIDE 10 MG: 5 TABLET ORAL at 05:47

## 2024-05-30 RX ADMIN — SODIUM CHLORIDE, PRESERVATIVE FREE 10 ML: 5 INJECTION INTRAVENOUS at 09:13

## 2024-05-30 RX ADMIN — ACETAMINOPHEN 1000 MG: 500 TABLET ORAL at 14:11

## 2024-05-30 RX ADMIN — IPRATROPIUM BROMIDE AND ALBUTEROL SULFATE 1 DOSE: .5; 3 SOLUTION RESPIRATORY (INHALATION) at 08:54

## 2024-05-30 RX ADMIN — METHOCARBAMOL TABLETS 1500 MG: 500 TABLET, COATED ORAL at 09:13

## 2024-05-30 RX ADMIN — IPRATROPIUM BROMIDE AND ALBUTEROL SULFATE 1 DOSE: .5; 3 SOLUTION RESPIRATORY (INHALATION) at 12:30

## 2024-05-30 RX ADMIN — OXYCODONE HYDROCHLORIDE 10 MG: 5 TABLET ORAL at 14:11

## 2024-05-30 RX ADMIN — ACETAMINOPHEN 1000 MG: 500 TABLET ORAL at 09:17

## 2024-05-30 RX ADMIN — ACETAMINOPHEN 1000 MG: 500 TABLET ORAL at 03:16

## 2024-05-30 RX ADMIN — METHOCARBAMOL TABLETS 1500 MG: 500 TABLET, COATED ORAL at 14:08

## 2024-05-30 RX ADMIN — CETIRIZINE HYDROCHLORIDE 5 MG: 5 TABLET ORAL at 09:14

## 2024-05-30 RX ADMIN — CITALOPRAM 40 MG: 40 TABLET, FILM COATED ORAL at 09:14

## 2024-05-30 RX ADMIN — BUPROPION HYDROCHLORIDE 300 MG: 300 TABLET, FILM COATED, EXTENDED RELEASE ORAL at 09:14

## 2024-05-30 RX ADMIN — BACITRACIN: 500 OINTMENT TOPICAL at 09:15

## 2024-05-30 RX ADMIN — FAMOTIDINE 20 MG: 20 TABLET, FILM COATED ORAL at 09:17

## 2024-05-30 ASSESSMENT — PAIN DESCRIPTION - LOCATION
LOCATION: BACK
LOCATION: BACK;HEAD

## 2024-05-30 ASSESSMENT — PAIN SCALES - GENERAL
PAINLEVEL_OUTOF10: 6
PAINLEVEL_OUTOF10: 5
PAINLEVEL_OUTOF10: 7
PAINLEVEL_OUTOF10: 7
PAINLEVEL_OUTOF10: 6
PAINLEVEL_OUTOF10: 6

## 2024-05-30 ASSESSMENT — PAIN DESCRIPTION - DESCRIPTORS: DESCRIPTORS: DISCOMFORT;ACHING

## 2024-05-30 ASSESSMENT — PAIN DESCRIPTION - ORIENTATION: ORIENTATION: RIGHT

## 2024-05-30 NOTE — PROGRESS NOTES
[facility]  INPATIENT PHYSICAL THERAPY  DAILY NOTE  [facility] ORTHOPED[facility] 7K - 7K-13/013-A    Time In: 1315  Time Out: 1338  Timed Code Treatment Minutes: 23 Minutes  Minutes: 23          Date: 2024  Patient Name: Luis Carlos Daigle III,  Gender:  male        MRN: [id number]  : 1965  (58 y.o.)     Referring Practitioner: Carley Moya MD  Diagnosis: fall, initial encounter  Additional Pertinent Hx: Per EMR \"57 yo male fell while cleaning brush off of roof presents with large laceration over scalp and pain to right foot. Denies any LOC, was approximatley 8-10 feet off the ground. Complains of pain on right foot and left head and face\" Left maxillary sinus fracture. Left 11-12 rib fractures.  Minimally displaced fracture of the left transverse process of L3. Fracture of 1st metatarsal on R LE. No surgical intervention warranted.     Prior Level of Function:  Lives With: Alone  Type of Home: House  Home Layout: One level  Home Access: Stairs to enter without rails  Entrance Stairs - Number of Steps: 2 BIA with a step into living room   Bathroom Shower/Tub: Tub/Shower unit  Bathroom Toilet: Standard  Bathroom Accessibility: Accessible    ADL Assistance: Independent  Homemaking Assistance: Independent  Ambulation Assistance: Independent  Transfer Assistance: Independent  Active : Yes  Additional Comments: IND, use of no AD, active    Restrictions/Precautions:  Restrictions/Precautions: Fall Risk, Weight Bearing, General Precautions  Right Lower Extremity Weight Bearing: Weight Bearing As Tolerated  Required Braces or Orthoses  Right Lower Extremity Brace:  (CAM boot)  Position Activity Restriction  Spinal Precautions: No Bending, No Lifting, No Twisting  Other position/activity restrictions: L3 transverse process fx, L rib fractures     SUBJECTIVE: RN approves therapy session. Patient resting in recliner and agrees to therapy session    PAIN: 0/10:     Vitals: Vitals not assessed per clinical

## 2024-05-30 NOTE — PLAN OF CARE
Problem: Discharge Planning  Goal: Discharge to home or other facility with appropriate resources  5/30/2024 1052 by Sturgeon, Cara B, RN  Outcome: Progressing  Flowsheets (Taken 5/30/2024 1052)  Discharge to home or other facility with appropriate resources:   Identify barriers to discharge with patient and caregiver   Identify discharge learning needs (meds, wound care, etc)   Arrange for needed discharge resources and transportation as appropriate     Problem: Pain  Goal: Verbalizes/displays adequate comfort level or baseline comfort level  5/30/2024 1052 by Sturgeon, Cara B, RN  Outcome: Progressing  Flowsheets (Taken 5/30/2024 1052)  Verbalizes/displays adequate comfort level or baseline comfort level:   Encourage patient to monitor pain and request assistance   Administer analgesics based on type and severity of pain and evaluate response   Consider cultural and social influences on pain and pain management   Assess pain using appropriate pain scale   Implement non-pharmacological measures as appropriate and evaluate response   Notify Licensed Independent Practitioner if interventions unsuccessful or patient reports new pain     Problem: Safety - Adult  Goal: Free from fall injury  5/30/2024 1052 by Sturgeon, Cara B, RN  Outcome: Progressing  Flowsheets (Taken 5/30/2024 1052)  Free From Fall Injury:   Instruct family/caregiver on patient safety   Based on caregiver fall risk screen, instruct family/caregiver to ask for assistance with transferring infant if caregiver noted to have fall risk factors     Problem: ABCDS Injury Assessment  Goal: Absence of physical injury  5/30/2024 1052 by Sturgeon, Cara B, RN  Outcome: Progressing  Flowsheets (Taken 5/30/2024 1052)  Absence of Physical Injury: Implement safety measures based on patient assessment     Problem: Skin/Tissue Integrity - Adult  Goal: Skin integrity remains intact  Outcome: Progressing  Flowsheets (Taken 5/30/2024 1052)  Skin Integrity Remains  Intact:   Monitor for areas of redness and/or skin breakdown   Assess vascular access sites hourly     Problem: Skin/Tissue Integrity - Adult  Goal: Incisions, wounds, or drain sites healing without S/S of infection  Outcome: Progressing  Flowsheets (Taken 5/30/2024 1052)  Incisions, Wounds, or Drain Sites Healing Without Sign and Symptoms of Infection: TWICE DAILY: Assess and document dressing/incision, wound bed, drain sites and surrounding tissue

## 2024-05-30 NOTE — FLOWSHEET NOTE
Patient was given CHG soap and instructed to use daily at home. Partially used bacritracin tube sent. CAM boot in placed.

## 2024-05-30 NOTE — PROGRESS NOTES
[facility]  STR ORTHOPEDICS 7K  Occupational Therapy  Daily Note  Time:   Time In: 1000  Time Out: 1025  Timed Code Treatment Minutes: 25 Minutes  Minutes: 25          Date: 2024  Patient Name: Luis Carlos Daigle III,   Gender: male      Room: [facility]13/013-A  MRN: [id number]  : 1965  (58 y.o.)  Referring Practitioner: Carley Moya MD  Diagnosis: Fall  Additional Pertinent Hx: Per EMR \"59 yo male fell while cleaning brush off of roof presents with large laceration over scalp and pain to right foot. Denies any LOC, was approximatley 8-10 feet off the ground. Complains of pain on right foot and left head and face\" Left maxillary sinus fracture. Left 11-12 rib fractures.  Minimally displaced fracture of the left transverse process of L3. Fracture of 1st metatarsal on R LE. No surgical intervention warranted.    Restrictions/Precautions:  Restrictions/Precautions: Fall Risk, Weight Bearing, General Precautions  Right Lower Extremity Weight Bearing: Weight Bearing As Tolerated  Required Braces or Orthoses  Right Lower Extremity Brace:  (CAM boot)  Position Activity Restriction  Spinal Precautions: No Bending, No Lifting, No Twisting  Other position/activity restrictions: L3 transverse process fx, L rib fractures     Social/Functional History:  Lives With: Alone  Type of Home: House  Home Layout: One level  Home Access: Stairs to enter without rails  Entrance Stairs - Number of Steps: 2 BIA with a step into living room   Bathroom Shower/Tub: Tub/Shower unit  Bathroom Toilet: Standard  Bathroom Accessibility: Accessible       ADL Assistance: Independent  Homemaking Assistance: Independent  Ambulation Assistance: Independent  Transfer Assistance: Independent    Active : Yes  Occupation: Full time employment  Type of Occupation: \"works on houses\"  Additional Comments: IND, use of no AD, active    SUBJECTIVE: RN ruied OT session.  Pt. In room finishing up bath with PCT bathing Pt.  Pt.

## 2024-05-30 NOTE — PLAN OF CARE
Problem: Discharge Planning  Goal: Discharge to home or other facility with appropriate resources  Outcome: Progressing  Flowsheets (Taken 5/30/2024 0233)  Discharge to home or other facility with appropriate resources: Identify barriers to discharge with patient and caregiver     Problem: Pain  Goal: Verbalizes/displays adequate comfort level or baseline comfort level  Outcome: Progressing  Flowsheets (Taken 5/30/2024 0233)  Verbalizes/displays adequate comfort level or baseline comfort level:   Encourage patient to monitor pain and request assistance   Administer analgesics based on type and severity of pain and evaluate response   Assess pain using appropriate pain scale   Implement non-pharmacological measures as appropriate and evaluate response     Problem: Safety - Adult  Goal: Free from fall injury  Outcome: Progressing  Flowsheets (Taken 5/30/2024 0233)  Free From Fall Injury: Instruct family/caregiver on patient safety  Note: Patient has remained free of physical injury during this shift. Safe environment provided, call light within reach, and hourly rounding completed.      Problem: ABCDS Injury Assessment  Goal: Absence of physical injury  Outcome: Progressing  Flowsheets (Taken 5/30/2024 0233)  Absence of Physical Injury: Implement safety measures based on patient assessment  Note: Patient has remained free of physical injury during this shift. Safe environment provided, call light within reach, and hourly rounding completed.    Care plan reviewed with patient.  Patient verbalizes understanding of the plan of care and contribute to goal setting.

## 2024-05-30 NOTE — PROGRESS NOTES
I have independently performed an evaluation on Luis Carlos . I have reviewed the above documentation completed by the NP, Selam Thomas   Italicized font, if present, represents changes to the note made by me.    Time spent with patient 20minutes.  Time could have been discontiguous.  Time does not include procedures.  Time does include my direct assessment of the patient and coordination of care.  Time represents more than 50% of the time involved with patient care by the surgical/tauma team.        Patient feeling much better, pain control improved. Patient desires discharge home.     Electronically signed by Carley Moya MD on 5/31/2024 at 9:02 AM  [facility]  Trauma Surgery - Dr. Carley Moya  Daily Progress Note    Pt Name: Luis Carlos Daigle III  Medical Record Number: [id number]  Date of Birth 1965   Today's Date: 5/30/2024    HD: # 4    CC:   Rib pain     ASSESSMENT  1.  Active Hospital Problems    Diagnosis Date Noted    Fracture of nasal bone [S02.2XXA] 05/26/2024    Closed fracture of transverse process of lumbar vertebra (HCC) [S32.009A] 05/26/2024    Metatarsal bone fracture [S92.309A] 05/26/2024    Scalp laceration [S01.01XA] 05/26/2024    Fracture of multiple ribs of left side [S22.42XA] 05/26/2024    Fall from roof, initial encounter [W13.2XXA] 05/26/2024         PLAN  Patient admitted under Trauma Services (w/ tele) to 7k     Trauma by fall (off roof 8-10 ft)   - Fall precautions    - PT/OT continue to treat as warranted    Closed head injury   - SLP to continue to treat   - 05/28: SLP eval: MOCA 25/30, ACE 9/22 indicates mild cognitive impairment, recommend skilled ST services to address deficits    Left maxillary sinus fracture    - CT facial bones reads There is deformity of the anterior wall of the left maxillary sinus consistent with a fracture. There is slight irregularity along the lateral wall of the left orbit which may represent a nondisplaced fracture.   - Consult ENT,    05/30/24 88   06/15/23 90   06/05/23 90       24 HR INTAKE/OUTPUT :   Intake/Output Summary (Last 24 hours) at 5/30/2024 1140  Last data filed at 5/30/2024 1041  Gross per 24 hour   Intake 650 ml   Output 800 ml   Net -150 ml       ADULT DIET; Regular    OBJECTIVE  CURRENT VITALS /82   Pulse 88   Temp 97.9 °F (36.6 °C) (Oral)   Resp 18   Wt 107 kg (236 lb)   SpO2 97%   BMI 34.85 kg/m²     GENERAL: A&O x3, cooperative with exam, no acute distress   HEENT: Normocephalic, traumatic appearance with multiple lacerations and abrasions to left scalp, right scalp with superficial laceration, pupils equal and reactive to light, nares patent bilaterally. Left eye ecchymosis and edema.   NEURO: Alert and orient x3, GCS 15, follows commands, PMS intact in all four extremities, no signs of focal neurological deficits  CSPINE/BACK: Did not evaluate  HEART: Regular rate and rhythm with no obvious murmurs, rubs, gallops.  Distal pulses intact.  LUNGS/CHEST WALL: Lungs diminished in bases with expiratory wheezing noted anteriorly. No respiratory distress or increased work of breathing. Mild chest wall tenderness to palpation.  + abrasions   ABDOMEN: Abdomen soft, nondistended, with no tenderness to palpation.  No guarding or peritoneal signs.  Bowel sounds normal active  EXTREMITIES: No cyanosis. RLE edematous, ecchymotic and TTP.  PMS intact in all four extremities.   Range of motion intact aside from R foot and ankle, not tested 2/2 known injury. Strength 5/5 bilaterally with  strength and left plantar/dorsiflexion.  SKIN: Warm and dry  WOUNDS: mulltiple deep scattered and large abrasions to BUE, left shoulder and arm, bilateral knees, R shoulder.  Staples are intact to left scalp laceration.      LABS  CBC :   No results for input(s): \"WBC\", \"HGB\", \"HCT\", \"MCV\", \"PLT\" in the last 72 hours.    BMP:   No results for input(s): \"NA\", \"K\", \"CL\", \"CO2\", \"BUN\", \"CREATININE\" in the last 72 hours.    COAGS:   No results

## 2024-05-30 NOTE — DISCHARGE SUMMARY
Discharge Summary   Trauma Services    Patient Identification:  Luis Carlos Daigle III  : 1965  MRN: [id number]   Account: [id number]     Admit date: 2024  Discharge date: 24  Attending provider: No att. providers found        Primary care provider: Jaxon Colon MD     Discharge Diagnoses:   Principal Problem:    Fall from roof, initial encounter  Active Problems:    Fracture of nasal bone    Closed fracture of transverse process of lumbar vertebra (HCC)    Metatarsal bone fracture    Scalp laceration    Fracture of multiple ribs of left side  Resolved Problems:    * No resolved hospital problems. *       Hospital Course:   Luis Carlos Daigle III is a 58 y.o. male admitted to [facility] on 2024 for a closed head injury, left maxillary sinus fracture, left 11 & 12 rib fractures, left L3 transverse process fracture, fracture of the base of the right 1st metatarsal, scalp abrasions and lacerations and abrasions of the bilateral upper and lower extremities following a fall from a roof with no known loss of consciousness. Report stated the patient was cleaning brush off of a roof and slid down the roof, subsequently falling 8-10 feet to the ground.  Admitted under trauma services to . Inpatient management included as follows:  Patient admitted under Trauma Services (w/ tele) to       Trauma by fall (off roof 8-10 ft)              - Fall precautions               - PT/OT continue to treat as warranted     Closed head injury              - SLP to continue to treat              - : SLP eval: MOCA 25/30, ACE / indicates mild cognitive impairment, recommend skilled ST services to address deficits     Left maxillary sinus fracture               - CT facial bones reads There is deformity of the anterior wall of the left maxillary sinus consistent with a fracture. There is slight irregularity along the lateral wall of the left orbit which may represent a nondisplaced  of intrathecal contrast limits the evaluation of the spinal canal. No significant central canal narrowing. No significant neural foraminal narrowing.     1. Minimally displaced fracture of the left transverse process of L3. 2. Bilateral adrenal hyperplasia. 3. Mild hepatic steatosis. 4. Other findings as described above. **This report has been created using voice recognition software.  It may contain minor errors which are inherent in voice recognition technology.**    CT cervical spine without contrast    Result Date: 5/26/2024  PROCEDURE: CT CERVICAL SPINE WO CONTRAST CLINICAL INFORMATION: trauma. COMPARISON: No prior study. TECHNIQUE: 3 mm noncontrast axial images were obtained through the cervical spine with sagittal and coronal reconstructions. All CT scans at this facility use dose modulation, iterative reconstruction, and/or weight-based dosing when appropriate to reduce radiation dose to as low as reasonably achievable. FINDINGS: There is straightening of the normal cervical lordosis..  There is no fracture. There is no prevertebral soft tissue swelling. There are degenerative changes at C4-5, C5-6 and C6-7.. No suspicious osseous lesions are present. At C1-2, there is normal alignment of the dens relative to anterior arch of C1. At C2-3, C3-4 and C4-5, there is no disc herniation, canal or foraminal stenosis. At C5-6, there is mild canal and mild to moderate bilateral foraminal stenosis. At C6-7, there is mild canal and mild to moderate bilateral foraminal stenosis. At C7-T1, there is no disc herniation, canal or foraminal stenosis. There are no suspicious findings in the cervical soft tissues.  There are no suspicious findings in the lung apices.     1. Straightening of the normal cervical lordosis with superimposed cervical spondylosis at C4-5, C5-6 and C6/7. 2. No acute fracture noted.. **This report has been created using voice recognition software. It may contain minor errors which are inherent in

## 2024-05-30 NOTE — RT PROTOCOL NOTE
RT Inhaler-Nebulizer Bronchodilator Protocol Note    There is a bronchodilator order in the chart from a provider indicating to follow the RT Bronchodilator Protocol and there is an “Initiate RT Inhaler-Nebulizer Bronchodilator Protocol” order as well (see protocol at bottom of note).    CXR Findings:  No results found.    The findings from the last RT Protocol Assessment were as follows:   History Pulmonary Disease: Chronic pulmonary disease  Respiratory Pattern: Regular pattern and RR 12-20 bpm  Breath Sounds: Inspiratory and expiratory or bilateral wheezing and/or rhonchi  Cough: Strong, productive  Indication for Bronchodilator Therapy: Decreased or absent breath sounds, On home bronchodilators  Bronchodilator Assessment Score: 9    Aerosolized bronchodilator medication orders have been revised according to the RT Inhaler-Nebulizer Bronchodilator Protocol below.    Respiratory Therapist to perform RT Therapy Protocol Assessment initially then follow the protocol.  Repeat RT Therapy Protocol Assessment PRN for score 0-3 or on second treatment, BID, and PRN for scores above 3.    No Indications - adjust the frequency to every 6 hours PRN wheezing or bronchospasm, if no treatments needed after 48 hours then discontinue using Per Protocol order mode.     If indication present, adjust the RT bronchodilator orders based on the Bronchodilator Assessment Score as indicated below.  Use Inhaler orders unless patient has one or more of the following: on home nebulizer, not able to hold breath for 10 seconds, is not alert and oriented, cannot activate and use MDI correctly, or respiratory rate 25 breaths per minute or more, then use the equivalent nebulizer order(s) with same Frequency and PRN reasons based on the score.  If a patient is on this medication at home then do not decrease Frequency below that used at home.    0-3 - enter or revise RT bronchodilator order(s) to equivalent RT Bronchodilator order with Frequency

## 2024-05-31 NOTE — CARE COORDINATION
5/30/24, 2:00 PM EDT    Patient goals/plan/ treatment preferences discussed by  and .  Patient goals/plan/ treatment preferences reviewed with patient/ family.  Patient/ family verbalize understanding of discharge plan and are in agreement with goal/plan/treatment preferences.  Understanding was demonstrated using the teach back method.  AVS provided by RN at time of discharge, which includes all necessary medical information pertaining to the patients current course of illness, treatment, post-discharge goals of care, and treatment preferences.     Services At/After Discharge: Outpatient, OT, and PT    Plans home today with ex-wife Berta; new OP PT/OT is planned at [facility] (pt preference). Order handed to pt and faxed to OP dept. RW delivered to pt.        
DISCHARGE PLANNING EVALUATION  5/29/24, 10:32 AM EDT    Reason for Referral: Likely rehab placement  Decision Maker: Makes own decisions  Current Services:  None  New Services Requested: Outpatient therapy  Family/ Social/ Home environment: Patient stated that he resides at home alone in a one story home with a couple steps inside.   Payment Source: [place] Medicaid  Transportation at Discharge:  Friends  Post-acute (PAC) provider list was provided to patient. Patient was informed of their freedom to choose PAC provider. Discussed and offered to show the patient the relevant PAC Providers quality and resource use measures on Medicare Compare web site via computer based on patient's goals of care and treatment preferences. Questions regarding selection process were answered.      Teach Back Method used with Luis Carlos regarding care plan and options for discharge.   Patient verbalized understanding of the plan of care and contribute to goal setting.       Patient preferences and discharge plan: Patient stated that he resides at home alone and has a dog.  He stated that he does not want to go to a ECF or want home health.  He stated that he prefers to return home with outpatient therapy if possible.      Electronically signed by ANA Sheehan on 5/29/2024 at 10:32 AM          
Luis Carlos Daigle III was evaluated today and a DME order was entered for a wheeled walker because he requires this to successfully complete daily living tasks of bathing, toileting, and ambulating.  A wheeled walker is necessary due to the patient's unsteady gait, upper body weakness, and inability to  an ambulation device; and he can ambulate only by pushing a walker instead of a lesser assistive device such as a cane, crutch, or standard walker.  The need for this equipment was discussed with the patient and he understands and is in agreement.   
fracture noted.   CT thoracic/thorax lungs:  Nondisplaced fractures of the posterior left 11th and 12th ribs.   Bibasilar subsegmental atelectasis.   CT facial bones:  Fracture involving the anterior wall of the left maxillary sinus.   Slight irregularity along the lateral wall of left orbit which may now   presenting nondisplaced fracture.   Opacification of the left maxillary sinus and to a lesser extent in the   ethmoid air cells bilaterally and right maxillary sinus   CT head:  Soft tissue swelling in the scalp overlying the left frontal and temporal   calvarium and in the preseptal soft tissues over the left orbit. Deformity of the anterior wall of the left maxillary sinus and lateral wall   of the left orbit.   Right foot xray:Fracture at the base of the first metatarsal.         Patient Goals/Plan/Treatment Preferences: Met earlier with Luis Carlos; he lives home alone and had no emergency contact providers listed. He shared his contact is ex-wife Berta. Updated chart.  He plans home with Berta at time of discharge as he cannot live home alone. He requested RW; order placed. He request OP PT rehab at [facility]. Await order and med ready for discharge.          05/29/24 1138   Service Assessment   Patient Orientation Alert and Oriented   Cognition Alert   History Provided By Patient   Primary Caregiver Self   Accompanied By/Relationship unaccomp   Support Systems Other (Comment)  (ex-wife)   Patient's Healthcare Decision Maker is: Legal Next of Kin   PCP Verified by CM Yes   Last Visit to PCP Within last year   Prior Functional Level Independent in ADLs/IADLs   Current Functional Level Assistance with the following:;Mobility   Can patient return to prior living arrangement Unknown at present   Ability to make needs known: Good   Family able to assist with home care needs: Yes   Would you like for me to discuss the discharge plan with any other family members/significant others, and if so, who? No   Financial Resources

## 2024-06-07 ENCOUNTER — OFFICE VISIT (OUTPATIENT)
Dept: SURGERY | Age: 59
End: 2024-06-07

## 2024-06-07 VITALS
OXYGEN SATURATION: 96 % | TEMPERATURE: 97.3 F | SYSTOLIC BLOOD PRESSURE: 124 MMHG | HEIGHT: 69 IN | WEIGHT: 236 LBS | BODY MASS INDEX: 34.96 KG/M2 | DIASTOLIC BLOOD PRESSURE: 82 MMHG | HEART RATE: 94 BPM | RESPIRATION RATE: 18 BRPM
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
## 2024-06-07 DIAGNOSIS — S01.01XD LACERATION OF SCALP, SUBSEQUENT ENCOUNTER: Primary | ICD-10-CM

## 2024-06-07 DIAGNOSIS — S22.42XD CLOSED FRACTURE OF MULTIPLE RIBS OF LEFT SIDE WITH ROUTINE HEALING, SUBSEQUENT ENCOUNTER: ICD-10-CM

## 2024-06-07 ASSESSMENT — ENCOUNTER SYMPTOMS
WHEEZING: 0
APNEA: 0
EYE REDNESS: 0
SHORTNESS OF BREATH: 0
SORE THROAT: 0
ABDOMINAL PAIN: 0
DIARRHEA: 0
BACK PAIN: 1
VOMITING: 0
CHEST TIGHTNESS: 0
VOICE CHANGE: 0
BLOOD IN STOOL: 0
COLOR CHANGE: 0
RHINORRHEA: 0
EYE ITCHING: 0
TROUBLE SWALLOWING: 0
CHOKING: 0
EYE PAIN: 0
CONSTIPATION: 0
NAUSEA: 0
SINUS PRESSURE: 0
COUGH: 0
ABDOMINAL DISTENTION: 0
STRIDOR: 0
PHOTOPHOBIA: 0
FACIAL SWELLING: 0
EYE DISCHARGE: 0

## 2024-06-07 NOTE — PROGRESS NOTES
not apply route Please change ramp to starting pressure 6 and 10 min ramp time 1 each 0    Handicap Placard MISC by Does not apply route Duration 5 years 1 each 0    fluticasone (FLONASE) 50 MCG/ACT nasal spray 1 spray by Nasal route daily (Patient taking differently: 1 spray by Nasal route daily as needed) 3 Bottle 11    albuterol (PROVENTIL) (2.5 MG/3ML) 0.083% nebulizer solution Take 3 mLs by nebulization every 6 hours as needed for Wheezing or Shortness of Breath 1 Package 5    Respiratory Therapy Supplies (NEBULIZER COMPRESSOR) KIT by Does not apply route. 1 kit 0    amitriptyline (ELAVIL) 50 MG tablet Take 1 tablet by mouth nightly at bedtime. (Patient not taking: Reported on 2024)       No current facility-administered medications for this visit.        Objective   Vitals:   Temp: 97.3 °F (36.3 °C) I Temp  Av.2 °F (36.8 °C)  Min: 97.3 °F (36.3 °C)  Max: 99 °F (37.2 °C) I Pulse: 94 I Pulse  Av.8  Min: 76  Max: 98 I BP: 124/82 I Systolic (24hrs), Av , Min:124 , Max:124   ; Diastolic (24hrs), Av, Min:82, Max:82   I Respirations: 18 I Resp  Av.2  Min: 16  Max: 27 I SpO2: 96 % I SpO2  Av.8 %  Min: 90 %  Max: 99 % I   I Height: 175.3 cm (5' 9\") I   I Facility age limit for growth %urvashi is 20 years. I        Physical Exam:  Physical Exam  HENT:      Head: Normocephalic.      Right Ear: External ear normal.      Left Ear: External ear normal.   Cardiovascular:      Rate and Rhythm: Normal rate.      Pulses: Normal pulses.      Heart sounds: Normal heart sounds.   Pulmonary:      Effort: Pulmonary effort is normal.      Breath sounds: Normal breath sounds.   Abdominal:      General: Abdomen is flat.   Musculoskeletal:         General: Normal range of motion.      Cervical back: Normal range of motion.   Skin:     Capillary Refill: Capillary refill takes less than 2 seconds.      Findings: Abrasion present.          Neurological:      General: No focal deficit present.      Mental

## 2024-06-13 RX ORDER — PSEUDOEPHED/ACETAMINOPH/DIPHEN 30MG-500MG
TABLET ORAL
Qty: 120 TABLET | Refills: 0 | Status: SHIPPED | OUTPATIENT
Start: 2024-06-13

## 2024-06-14 ENCOUNTER — OFFICE VISIT (OUTPATIENT)
Dept: SURGERY | Age: 59
End: 2024-06-14
Payer: MEDICAID

## 2024-06-14 VITALS
WEIGHT: 227 LBS | RESPIRATION RATE: 18 BRPM | BODY MASS INDEX: 33.62 KG/M2 | HEIGHT: 69 IN | TEMPERATURE: 97.5 F | DIASTOLIC BLOOD PRESSURE: 86 MMHG | SYSTOLIC BLOOD PRESSURE: 144 MMHG

## 2024-06-14 DIAGNOSIS — S01.81XD FOREHEAD LACERATION, SUBSEQUENT ENCOUNTER: Primary | ICD-10-CM

## 2024-06-14 PROCEDURE — 99212 OFFICE O/P EST SF 10 MIN: CPT | Performed by: NURSE PRACTITIONER

## 2024-06-14 PROCEDURE — 3079F DIAST BP 80-89 MM HG: CPT | Performed by: NURSE PRACTITIONER

## 2024-06-14 PROCEDURE — 3077F SYST BP >= 140 MM HG: CPT | Performed by: NURSE PRACTITIONER

## 2024-06-14 NOTE — PROGRESS NOTES
tolerated the procedure well. No follow up needed.     Follow-up with trauma clinic as needed.     Return to office, or ED if worsening symptoms, shortness of breath, fever, bleeding that persists for 15 minutes despite pressure, or severe pain occur.   (meds, imaging, follow up, referral, warnings to return)N/A    Electronically signed by ZOHREH Xie - CNP-C on 6/14/2024 at 9:15 AM

## 2024-06-21 RX ORDER — ACETAMINOPHEN 500 MG
TABLET ORAL
Qty: 120 TABLET | Refills: 0 | OUTPATIENT
Start: 2024-06-21

## 2024-10-25 RX ORDER — METHOCARBAMOL 750 MG/1
1500 TABLET, FILM COATED ORAL 3 TIMES DAILY
Qty: 180 TABLET | OUTPATIENT
Start: 2024-10-25

## 2024-10-25 RX ORDER — IPRATROPIUM BROMIDE AND ALBUTEROL SULFATE 2.5; .5 MG/3ML; MG/3ML
SOLUTION RESPIRATORY (INHALATION)
Qty: 360 ML | OUTPATIENT
Start: 2024-10-25

## 2024-12-22 ENCOUNTER — HOSPITAL ENCOUNTER (EMERGENCY)
Age: 59
Discharge: HOME | End: 2024-12-22
Attending: STUDENT IN AN ORGANIZED HEALTH CARE EDUCATION/TRAINING PROGRAM
Payer: MEDICAID

## 2024-12-22 VITALS
HEIGHT: 69 IN | RESPIRATION RATE: 18 BRPM | OXYGEN SATURATION: 99 % | SYSTOLIC BLOOD PRESSURE: 108 MMHG | WEIGHT: 225 LBS | DIASTOLIC BLOOD PRESSURE: 96 MMHG | BODY MASS INDEX: 33.33 KG/M2 | HEART RATE: 98 BPM | TEMPERATURE: 97.7 F

## 2024-12-22 DIAGNOSIS — R39.9 LOWER URINARY TRACT SYMPTOMS (LUTS): ICD-10-CM

## 2024-12-22 DIAGNOSIS — K64.9 BLEEDING HEMORRHOIDS: Primary | ICD-10-CM

## 2024-12-22 LAB
ALBUMIN SERPL BCG-MCNC: 3.8 G/DL (ref 3.5–5.1)
ALP SERPL-CCNC: 82 U/L (ref 38–126)
ALT SERPL W/O P-5'-P-CCNC: 10 U/L (ref 11–66)
AMPHETAMINES UR QL SCN: NEGATIVE
ANION GAP SERPL CALC-SCNC: 10 MEQ/L (ref 8–16)
AST SERPL-CCNC: 12 U/L (ref 5–40)
BARBITURATES UR QL SCN: NEGATIVE
BASOPHILS ABSOLUTE: 0 THOU/MM3 (ref 0–0.1)
BASOPHILS NFR BLD AUTO: 0.4 %
BENZODIAZ UR QL SCN: NEGATIVE
BILIRUB SERPL-MCNC: 0.4 MG/DL (ref 0.3–1.2)
BILIRUB UR QL STRIP.AUTO: NEGATIVE
BUN SERPL-MCNC: 8 MG/DL (ref 7–22)
BZE UR QL SCN: POSITIVE
CALCIUM SERPL-MCNC: 9.3 MG/DL (ref 8.5–10.5)
CANNABINOIDS UR QL SCN: POSITIVE
CHARACTER UR: CLEAR
CHLORIDE SERPL-SCNC: 105 MEQ/L (ref 98–111)
CO2 SERPL-SCNC: 29 MEQ/L (ref 23–33)
COLOR, UA: YELLOW
CREAT SERPL-MCNC: 1.1 MG/DL (ref 0.4–1.2)
DEPRECATED RDW RBC AUTO: 46.4 FL (ref 35–45)
EOSINOPHIL NFR BLD AUTO: 0.8 %
EOSINOPHILS ABSOLUTE: 0.1 THOU/MM3 (ref 0–0.4)
ERYTHROCYTE [DISTWIDTH] IN BLOOD BY AUTOMATED COUNT: 13.2 % (ref 11.5–14.5)
FENTANYL: NEGATIVE
GFR SERPL CREATININE-BSD FRML MDRD: 77 ML/MIN/1.73M2
GLUCOSE SERPL-MCNC: 83 MG/DL (ref 70–108)
GLUCOSE UR QL STRIP.AUTO: NEGATIVE MG/DL
HCT VFR BLD AUTO: 43.7 % (ref 42–52)
HGB BLD-MCNC: 14.6 GM/DL (ref 14–18)
HGB UR QL STRIP.AUTO: NEGATIVE
IMM GRANULOCYTES # BLD AUTO: 0.03 THOU/MM3 (ref 0–0.07)
IMM GRANULOCYTES NFR BLD AUTO: 0.4 %
KETONES UR QL STRIP.AUTO: NEGATIVE
LYMPHOCYTES ABSOLUTE: 1.7 THOU/MM3 (ref 1–4.8)
LYMPHOCYTES NFR BLD AUTO: 20.5 %
MCH RBC QN AUTO: 32.4 PG (ref 26–33)
MCHC RBC AUTO-ENTMCNC: 33.4 GM/DL (ref 32.2–35.5)
MCV RBC AUTO: 96.9 FL (ref 80–94)
MONOCYTES ABSOLUTE: 0.7 THOU/MM3 (ref 0.4–1.3)
MONOCYTES NFR BLD AUTO: 7.9 %
NEUTROPHILS ABSOLUTE: 6 THOU/MM3 (ref 1.8–7.7)
NEUTROPHILS NFR BLD AUTO: 70 %
NITRITE UR QL STRIP: NEGATIVE
NRBC BLD AUTO-RTO: 0 /100 WBC
OPIATES UR QL SCN: NEGATIVE
OSMOLALITY SERPL CALC.SUM OF ELEC: 284.3 MOSMOL/KG (ref 275–300)
OXYCODONE: NEGATIVE
PCP UR QL SCN: NEGATIVE
PH UR STRIP.AUTO: 7 [PH] (ref 5–9)
PLATELET # BLD AUTO: 216 THOU/MM3 (ref 130–400)
PMV BLD AUTO: 10.2 FL (ref 9.4–12.4)
POTASSIUM SERPL-SCNC: 3.6 MEQ/L (ref 3.5–5.2)
PROT SERPL-MCNC: 6.6 G/DL (ref 6.1–8)
PROT UR STRIP.AUTO-MCNC: NEGATIVE MG/DL
RBC # BLD AUTO: 4.51 MILL/MM3 (ref 4.7–6.1)
SODIUM SERPL-SCNC: 144 MEQ/L (ref 135–145)
SP GR UR REFRACT.AUTO: 1.01 (ref 1–1.03)
UROBILINOGEN, URINE: 1 EU/DL (ref 0–1)
WBC # BLD AUTO: 8.5 THOU/MM3 (ref 4.8–10.8)
WBC #/AREA URNS HPF: NEGATIVE /[HPF]

## 2024-12-22 PROCEDURE — 85025 COMPLETE CBC W/AUTO DIFF WBC: CPT

## 2024-12-22 PROCEDURE — 81003 URINALYSIS AUTO W/O SCOPE: CPT

## 2024-12-22 PROCEDURE — 99283 EMERGENCY DEPT VISIT LOW MDM: CPT

## 2024-12-22 PROCEDURE — 51798 US URINE CAPACITY MEASURE: CPT

## 2024-12-22 PROCEDURE — 80307 DRUG TEST PRSMV CHEM ANLYZR: CPT

## 2024-12-22 PROCEDURE — 36415 COLL VENOUS BLD VENIPUNCTURE: CPT

## 2024-12-22 PROCEDURE — 80053 COMPREHEN METABOLIC PANEL: CPT

## 2024-12-22 RX ORDER — LIDOCAINE HYDROCHLORIDE 20 MG/ML
JELLY TOPICAL ONCE
Status: DISCONTINUED | OUTPATIENT
Start: 2024-12-22 | End: 2024-12-22 | Stop reason: HOSPADM

## 2024-12-22 RX ORDER — BENZOCAINE/MENTHOL 6 MG-10 MG
LOZENGE MUCOUS MEMBRANE ONCE
Status: DISCONTINUED | OUTPATIENT
Start: 2024-12-22 | End: 2024-12-22 | Stop reason: HOSPADM

## 2024-12-22 ASSESSMENT — PAIN SCALES - GENERAL
PAINLEVEL_OUTOF10: 7
PAINLEVEL_OUTOF10: 8

## 2024-12-22 ASSESSMENT — PAIN - FUNCTIONAL ASSESSMENT
PAIN_FUNCTIONAL_ASSESSMENT: NONE - DENIES PAIN
PAIN_FUNCTIONAL_ASSESSMENT: 0-10
PAIN_FUNCTIONAL_ASSESSMENT: NONE - DENIES PAIN
PAIN_FUNCTIONAL_ASSESSMENT: 0-10

## 2024-12-22 NOTE — ED TRIAGE NOTES
Pt to ED c/o hemorrhoids bleeding and urinary retention. Pt states he noticed the blood today and stated this is a chronic thing. Pt states he has urinated once since 3 am. Pt denies any burning or pain when urinating. Pt A&O.

## 2024-12-22 NOTE — DISCHARGE INSTRUCTIONS
You were seen today for hemorrhoids and concern of not being able to pee.    Your urine does not have an infection your lab work was reassuring.    Recommend you follow-up with your established gastroenterologist, Dr. Healy.    Urology on January 2, 2024 - 8am    If you are unable to urinate come back to the Emergency Department immediately

## 2024-12-22 NOTE — DISCHARGE INSTR - COC
Continuity of Care Form    Patient Name: Luis Carlos Daigle III   :  1965  MRN:  [id number]    Admit date:  2024  Discharge date:  ***    Code Status Order: Prior   Advance Directives:   Advance Care Flowsheet Documentation             Admitting Physician:  No admitting provider for patient encounter.  PCP: Jaxon Colon MD    Discharging Nurse: ***  Discharging Hospital Unit/Room#: 10/010A  Discharging Unit Phone Number: ***    Emergency Contact:   Extended Emergency Contact Information  Primary Emergency Contact: Berta Daigle  Mobile Phone: [phone]  Relation: Ex-Spouse  Preferred language: English    Past Surgical History:  Past Surgical History:   Procedure Laterality Date    CARDIAC CATHETERIZATION  Dec 2012    CHOLECYSTECTOMY      COLONOSCOPY  2011    MANDIBLE FRACTURE SURGERY      UPPER GASTROINTESTINAL ENDOSCOPY  2009    UPPER GASTROINTESTINAL ENDOSCOPY  13    sanchez's esophagus repeat 3 yr       Immunization History:   Immunization History   Administered Date(s) Administered    Influenza 2013    Influenza Virus Vaccine 10/20/2015    Influenza, AFLURIA (age 3 y+), FLUZONE, (age 6 mo+), Quadv MDV, 0.5mL 10/25/2016    Pneumococcal, PCV-13, PREVNAR 13, (age 6w+), IM, 0.5mL 10/25/2016    TDaP, ADACEL (age 10y-64y), BOOSTRIX (age 10y+), IM, 0.5mL 2023, 2024       Active Problems:  Patient Active Problem List   Diagnosis Code    GERD (gastroesophageal reflux disease) K21.9    HEP C W/ COMA, ACUTE/NOS     Sanchez's esophagus K22.70    Depression F32.A    COPD (chronic obstructive pulmonary disease) ([facility]) J44.9    Tobacco abuse Z72.0    PND (paroxysmal nocturnal dyspnea) R06.00    Hypertension I10    RLS (restless legs syndrome) G25.81    Hypercholesteremia E78.00    Snoring R06.83    Hypersomnia G47.10    Bipolar disorder ([facility]) F31.9    Obstructive sleep apnea on CPAP G47.33    Acute maxillary sinusitis J01.00    Symptom of wheezing R06.2    Obesity

## 2024-12-22 NOTE — ED PROVIDER NOTES
Code Status:  Not addressed during this ED visit    Social determinants of health impacting treatment or disposition:  Considered and not applicable     MIPS documentation:  N/A    Medical Decision Making    59-year-old male with PMHx of COPD, Webb's esophagus, GERD, DARON, and hemorrhoids return to the ED complaining of hemorrhoid pain with urinary tension.  On physical, patient had external tender hemorrhoid no discoloration, bleeding, or fistula visualized, physical otherwise unremarkable.  Patient informed that he will need Preparation H for his hemorrhoid pain and needs to follow-up with his gastroenterologist .  Patient was able to urinate during his ED stay, patient advised to follow-up with urologist for his BPH.  Patient advised to return to ED if any concerns arise.  Patient advised to take medication as prescribed.    ED COURSE   ED Medications administered this visit (None if left blank):   Medications - No data to display               PROCEDURES: (None if blank)  Procedures:     CRITICAL CARE:  None    MEDICATION CHANGES     Discharge Medication List as of 12/22/2024  3:21 PM            FINAL DISPOSITION   Shared Decision-Making was performed, disposition discussed with the patient/family and questions answered.      Outpatient follow up (If applicable):  Radha Healy MD  [address]  [facility]  [phone]    Schedule an appointment as soon as possible for a visit       Jaxon Colon MD  [address] [facility] [address] [phone]    Schedule an appointment as soon as possible for a visit       [facility]  [address]  [facility] [id number] [phone]  Go on 1/2/2025  8am    The patient’s provisional diagnosis and plan of care were discussed with the patient and present family who expressed understanding. Medications were reviewed and indications and risks of medications were discussed with the patient/family present.

## 2024-12-25 ASSESSMENT — ENCOUNTER SYMPTOMS
NAUSEA: 0
PHOTOPHOBIA: 0
VOMITING: 0
COUGH: 0
ANAL BLEEDING: 1
RECTAL PAIN: 1
TROUBLE SWALLOWING: 0
DIARRHEA: 0
SHORTNESS OF BREATH: 0
CHOKING: 0
ABDOMINAL PAIN: 0
SORE THROAT: 0
CONSTIPATION: 0

## 2025-01-02 ENCOUNTER — TELEPHONE (OUTPATIENT)
Dept: PULMONOLOGY | Age: 60
End: 2025-01-02

## 2025-01-02 ENCOUNTER — OFFICE VISIT (OUTPATIENT)
Dept: UROLOGY | Age: 60
End: 2025-01-02
Payer: MEDICAID

## 2025-01-02 VITALS
OXYGEN SATURATION: 98 % | RESPIRATION RATE: 20 BRPM | HEART RATE: 94 BPM | WEIGHT: 216 LBS | TEMPERATURE: 98.3 F | HEIGHT: 69 IN | BODY MASS INDEX: 31.99 KG/M2

## 2025-01-02 DIAGNOSIS — R39.9 LOWER URINARY TRACT SYMPTOMS (LUTS): ICD-10-CM

## 2025-01-02 DIAGNOSIS — E27.8 ADRENAL HYPERPLASIA (HCC): ICD-10-CM

## 2025-01-02 DIAGNOSIS — Z87.891 PERSONAL HISTORY OF SMOKING: Primary | ICD-10-CM

## 2025-01-02 DIAGNOSIS — N39.0 URINARY TRACT INFECTION WITHOUT HEMATURIA, SITE UNSPECIFIED: Primary | ICD-10-CM

## 2025-01-02 DIAGNOSIS — R30.0 DYSURIA: ICD-10-CM

## 2025-01-02 LAB — POST VOID RESIDUAL (PVR): 0 ML

## 2025-01-02 PROCEDURE — 99204 OFFICE O/P NEW MOD 45 MIN: CPT | Performed by: NURSE PRACTITIONER

## 2025-01-02 PROCEDURE — 51798 US URINE CAPACITY MEASURE: CPT | Performed by: NURSE PRACTITIONER

## 2025-01-02 RX ORDER — TAMSULOSIN HYDROCHLORIDE 0.4 MG/1
0.4 CAPSULE ORAL DAILY
Qty: 90 CAPSULE | Refills: 1 | Status: SHIPPED | OUTPATIENT
Start: 2025-01-02

## 2025-01-02 NOTE — TELEPHONE ENCOUNTER
Patient followed with you in  for his asthma and COPD.. patient had fallen off the schedule due to his insurance being denied here. Patient is now back on for 25. There is a CT lung scree in the chart , would you like patient to get this done? Thank you

## 2025-01-02 NOTE — PROGRESS NOTES
Positive for polydipsia.   Genitourinary:  Positive for difficulty urinating, frequency and urgency. Negative for dysuria, flank pain and hematuria.        +nocturia, weak stream   Musculoskeletal: Negative.    Skin: Negative.    Neurological: Negative.    Psychiatric/Behavioral: Negative.          Physical Exam:    This a 59 y.o. male   Vitals:    01/02/25 0800   Pulse: 94   Resp: 20   Temp: 98.3 °F (36.8 °C)   SpO2: 98%     Physical Exam  Constitutional:       Appearance: Normal appearance.   HENT:      Head: Normocephalic and atraumatic.   Eyes:      Extraocular Movements: Extraocular movements intact.   Pulmonary:      Effort: Pulmonary effort is normal.   Abdominal:      Palpations: Abdomen is soft.      Tenderness: There is no abdominal tenderness. There is no right CVA tenderness or left CVA tenderness.   Musculoskeletal:         General: Normal range of motion.      Cervical back: Normal range of motion.   Skin:     General: Skin is warm and dry.   Neurological:      Mental Status: He is alert and oriented to person, place, and time.   Psychiatric:         Mood and Affect: Mood normal.         Behavior: Behavior normal.          Assessment and Plan      1. Urinary tract infection without hematuria, site unspecified    2. Dysuria    3. Lower urinary tract symptoms (LUTS)    4. Adrenal hyperplasia (HCC)         Start flomax 0.4mg daily at bedtime.    Stand to urinate  Prevent constipation    Consider cystoscopy if no improvement    Follow up in 4 weeks with [facility].      Prescriptions Ordered:  Orders Placed This Encounter   Medications    tamsulosin (FLOMAX) 0.4 MG capsule     Sig: Take 1 capsule by mouth daily     Dispense:  90 capsule     Refill:  1      Orders Placed:  Orders Placed This Encounter   Procedures    poct post void residual     Bladder scan            Return in about 4 weeks (around 1/30/2025) for lower urinary tract symptoms. .        Arlen Edward, APRN - CNP

## 2025-01-02 NOTE — PATIENT INSTRUCTIONS
Start flomax 0.4mg daily at bedtime.    Stand to urinate  Prevent constipation    Consider cystoscopy if no improvement      Avoid bladder irritants.    Bladder irritants    For some people, certain foods may irritate the bladder, causing or making bladder symptoms worse. If symptoms are related to diet, avoiding highly acidic or spicy foods, alcohol, or carbonated drinks should bring relief after approximately 10 days. Once the symptoms have improved on a restricted diet, patients can gradually add these foods back into the diet. If one specific food does cause symptoms, it can be easily identified and avoided.     Foods that should be avoided:       Apples     Plums  Apple juice    Strawberries  [place]    Tea  Carbonated beverages   Tomatoes  Chilies/spicy foods   Vinegar  Chocolate    Vitamin B complex  Citrus fruits    Bananas  Coffee (including decaffeinated)  Saccharin sweetners (Sweet'n low)  Cranberries    Soy sauce  Grapes     Alcohol  Guava     Processed meat and fish  Nutrasweet    Tofu  Mayonnaise    Yogurt  Peaches    Pineapple

## 2025-01-08 ENCOUNTER — HOSPITAL ENCOUNTER (OUTPATIENT)
Dept: CT IMAGING | Age: 60
Discharge: HOME | End: 2025-01-08
Attending: INTERNAL MEDICINE
Payer: MEDICAID

## 2025-01-08 DIAGNOSIS — Z87.891 PERSONAL HISTORY OF SMOKING: ICD-10-CM

## 2025-01-08 PROCEDURE — 71271 CT THORAX LUNG CANCER SCR C-: CPT

## 2025-01-27 ENCOUNTER — TELEPHONE (OUTPATIENT)
Dept: PULMONOLOGY | Age: 60
End: 2025-01-27

## 2025-01-27 RX ORDER — PREDNISONE 20 MG/1
20 TABLET ORAL 2 TIMES DAILY
Qty: 10 TABLET | Refills: 0 | Status: SHIPPED | OUTPATIENT
Start: 2025-01-27 | End: 2025-02-01

## 2025-01-27 NOTE — TELEPHONE ENCOUNTER
Patient called in today stating he has been having a real bad dry cough with chest tightening and SOB. The patient is requesting some prednisone to be sent to [facility] on [address]. Patient is also scheduled to see you next month on 2/13. Please advise.

## 2025-02-13 ENCOUNTER — OFFICE VISIT (OUTPATIENT)
Dept: PULMONOLOGY | Age: 60
End: 2025-02-13
Payer: MEDICAID

## 2025-02-13 VITALS
OXYGEN SATURATION: 100 % | HEART RATE: 92 BPM | HEIGHT: 69 IN | BODY MASS INDEX: 33.18 KG/M2 | TEMPERATURE: 98.9 F | SYSTOLIC BLOOD PRESSURE: 118 MMHG | WEIGHT: 224 LBS | DIASTOLIC BLOOD PRESSURE: 72 MMHG

## 2025-02-13 DIAGNOSIS — J44.9 CHRONIC OBSTRUCTIVE PULMONARY DISEASE, UNSPECIFIED COPD TYPE (HCC): ICD-10-CM

## 2025-02-13 DIAGNOSIS — F12.90 MARIJUANA SMOKER, CONTINUOUS: Primary | ICD-10-CM

## 2025-02-13 DIAGNOSIS — R91.1 LUNG NODULE: ICD-10-CM

## 2025-02-13 DIAGNOSIS — R06.5 MOUTH BREATHING: ICD-10-CM

## 2025-02-13 DIAGNOSIS — J44.1 COPD EXACERBATION (HCC): ICD-10-CM

## 2025-02-13 PROCEDURE — 3074F SYST BP LT 130 MM HG: CPT | Performed by: INTERNAL MEDICINE

## 2025-02-13 PROCEDURE — 3078F DIAST BP <80 MM HG: CPT | Performed by: INTERNAL MEDICINE

## 2025-02-13 PROCEDURE — 99214 OFFICE O/P EST MOD 30 MIN: CPT | Performed by: INTERNAL MEDICINE

## 2025-02-13 RX ORDER — ALBUTEROL SULFATE 0.83 MG/ML
2.5 SOLUTION RESPIRATORY (INHALATION) EVERY 6 HOURS PRN
Qty: 360 ML | Refills: 5 | Status: SHIPPED | OUTPATIENT
Start: 2025-02-13

## 2025-02-13 RX ORDER — FLUTICASONE PROPIONATE AND SALMETEROL 250; 50 UG/1; UG/1
1 POWDER RESPIRATORY (INHALATION) EVERY 12 HOURS
Qty: 60 EACH | Refills: 3 | Status: SHIPPED | OUTPATIENT
Start: 2025-02-13

## 2025-02-13 RX ORDER — ALBUTEROL SULFATE 90 UG/1
2 INHALANT RESPIRATORY (INHALATION) EVERY 6 HOURS PRN
Qty: 8.5 G | Refills: 5 | Status: SHIPPED | OUTPATIENT
Start: 2025-02-13

## 2025-02-13 NOTE — PROGRESS NOTES
Subjective:      CC: COPD    Patient ID: Luis Carlos Daigle III is a 59 y.o. male.    Luis Carlos comes for a scheduled follow-up, current regimen includes Symbicort 2 inhalations twice a day (medication not found in medicine list), albuterol nebulizer and MDI, respiratory condition at baseline, persisting noisy mouth breathing, able to do ADLs, would get dyspneic with any moderate activity like climbing stairs, chronic productive cough, continues smoking marijuana joints daily    Obstructive sleep apnea with inconsistent compliance, has a scheduled follow-up in the sleep clinic soon    On 5/26/2024, Luis Carlos fell from his roof sustaining multiple trauma to include close head injury, left maxillary sinus fracture, left 11th and 12th rib fractures, left L3 transverse process fracture, fracture of the base of the right first metatarsal, and scalp abrasions and lacerations    Completed last screening CT scan of the chest for early diagnosis of lung cancer on 1/8/2025 read as a lung RADS 3 due to a small 4 mm noncalcified nodule in the right middle lung    Previous notes    Luis Carlos comes back for follow-up due to history of asthma COPD overlap he quit smoking cigarettes several years ago however he enjoys cigars every now and then and daily cannabis.  Current regimen seems to be not effective any longer, Symbicort, Spiriva, albuterol.  Obstructive sleep apnea suboptimal compliance with PAP treatment.  Under quite a bit of stress as his father entered hospice at the nursing home and he is having some issues  No recent pulmonary function test  Due for baseline screening CT of the chest for lung cancer.    Smoking one cigar every now and then, \"but do not inhale\"  No visits to ED  Needs refill for Spiriva  DAVE with minimal activities ---declines rehab rehab    Tells me he uses Symbicort and Ventolin consistently    FEV1 68%    Review of Systems     HENT: Bilateral nasal obstruction, no air movement through the nostrils, edematous mucosa,

## 2025-02-13 NOTE — PROGRESS NOTES
Neck Circumference -   17.5  Mallampati - 4    Lung Nodule Screening     [] Qualifies    [x] Does not qualify   [] Declined    [] Completed

## 2025-02-25 ENCOUNTER — OFFICE VISIT (OUTPATIENT)
Dept: PULMONOLOGY | Age: 60
End: 2025-02-25
Payer: MEDICAID

## 2025-02-25 VITALS
SYSTOLIC BLOOD PRESSURE: 130 MMHG | OXYGEN SATURATION: 98 % | TEMPERATURE: 98.8 F | HEIGHT: 69 IN | BODY MASS INDEX: 33.59 KG/M2 | WEIGHT: 226.8 LBS | HEART RATE: 86 BPM | DIASTOLIC BLOOD PRESSURE: 68 MMHG

## 2025-02-25 DIAGNOSIS — G47.33 OSA ON CPAP: Primary | ICD-10-CM

## 2025-02-25 DIAGNOSIS — E66.9 OBESITY (BMI 30-39.9): ICD-10-CM

## 2025-02-25 PROCEDURE — 3078F DIAST BP <80 MM HG: CPT | Performed by: NURSE PRACTITIONER

## 2025-02-25 PROCEDURE — 3075F SYST BP GE 130 - 139MM HG: CPT | Performed by: NURSE PRACTITIONER

## 2025-02-25 PROCEDURE — 99214 OFFICE O/P EST MOD 30 MIN: CPT | Performed by: NURSE PRACTITIONER

## 2025-02-25 RX ORDER — BUDESONIDE AND FORMOTEROL FUMARATE DIHYDRATE 80; 4.5 UG/1; UG/1
2 AEROSOL RESPIRATORY (INHALATION) DAILY
COMMUNITY

## 2025-02-25 RX ORDER — IPRATROPIUM BROMIDE AND ALBUTEROL SULFATE 2.5; .5 MG/3ML; MG/3ML
SOLUTION RESPIRATORY (INHALATION)
COMMUNITY
Start: 2025-01-06

## 2025-02-25 RX ORDER — IBUPROFEN 800 MG/1
TABLET, FILM COATED ORAL
COMMUNITY
Start: 2024-04-22

## 2025-02-25 ASSESSMENT — ENCOUNTER SYMPTOMS: SHORTNESS OF BREATH: 1

## 2025-02-25 NOTE — PROGRESS NOTES
[facility]      Luis Carlos aDigle III         [id number]  2/25/2025   Chief Complaint   Patient presents with    Follow-up     DARON follow up, last visit 6/5/2023 with [facility]. No DL patient has not used machine since 8/8/24        Pt of Dr. Milan MARCH Download:   Original or initial AHI: 53.9     Date of initial study: 5/21/13      Machine/Mfg:   [] ResMed    [x] Respironics/Dreamstation   Interface:   [] Nasal    [] Nasal pillows   [x] FFM      Provider:      [] [facility]     [][facility]     [x] [employer]    [] [facility]    [] [facility]               [] [facility]      [] [employer]    [] Loris:      [] Other    Neck Size: 19 inches  Mallampati 4  ESS:  16  SAQLI: 47    Here is a scan of the most recent download:  **NO DOWNLOAD PAP NOT CURRENTLY USING PAP MACHINE.       Presentation:   Luis Carlos presents for 2 year sleep medicine follow up for obstructive sleep apnea  Patient reports that he has been incarcerated several times since last time that we have seen him.  During his last incarceration states that he was messing with his CPAP machine settings and now the machine doesn't seem right, is having trouble with the humidifier.  He has not used the machine over the last 30 days.  He does report he misses wearing the machine he was feeling much benefit when using and he does want a get back on PAP therapy.      Progress History:   Since last visit any new medical issues? No  Any trouble with Machine No  Any new sleep medicines? no      Equipment issues:  The pressure is  acceptable, the mask is acceptable     Review of Systems -   Review of Systems   Constitutional:  Positive for fatigue.   Respiratory:  Positive for shortness of breath.    Musculoskeletal:  Positive for arthralgias.   Psychiatric/Behavioral:  Positive for sleep disturbance. The patient is nervous/anxious.    All other systems reviewed and are negative.       Physical Exam:    BMI:  Body mass index is 33.49 kg/m².

## 2025-03-06 ASSESSMENT — ENCOUNTER SYMPTOMS
COUGH: 0
ABDOMINAL PAIN: 0
SHORTNESS OF BREATH: 0

## 2025-03-07 ENCOUNTER — OFFICE VISIT (OUTPATIENT)
Dept: UROLOGY | Age: 60
End: 2025-03-07

## 2025-03-07 VITALS — HEIGHT: 69 IN | RESPIRATION RATE: 12 BRPM | WEIGHT: 226 LBS | BODY MASS INDEX: 33.47 KG/M2

## 2025-03-07 DIAGNOSIS — N39.0 URINARY TRACT INFECTION WITHOUT HEMATURIA, SITE UNSPECIFIED: ICD-10-CM

## 2025-03-07 DIAGNOSIS — N40.1 BENIGN PROSTATIC HYPERPLASIA WITH LOWER URINARY TRACT SYMPTOMS, SYMPTOM DETAILS UNSPECIFIED: Primary | ICD-10-CM

## 2025-03-07 DIAGNOSIS — R39.9 LOWER URINARY TRACT SYMPTOMS (LUTS): ICD-10-CM

## 2025-03-07 DIAGNOSIS — E27.8 ADRENAL HYPERPLASIA: ICD-10-CM

## 2025-03-07 LAB — POST VOID RESIDUAL (PVR): 9 ML

## 2025-03-07 RX ORDER — TAMSULOSIN HYDROCHLORIDE 0.4 MG/1
0.4 CAPSULE ORAL DAILY
Qty: 90 CAPSULE | Refills: 3 | Status: SHIPPED | OUTPATIENT
Start: 2025-03-07

## 2025-03-07 NOTE — PROGRESS NOTES
to next appointment in 6 months     Standing Status:   Future     Standing Expiration Date:   3/7/2026    poct post void residual     Bladder scan            Return in about 6 months (around 9/7/2025) for luts, bph, .        Arlen Edward, ZOHREH - CNP

## 2025-03-07 NOTE — PATIENT INSTRUCTIONS
PSA -lab -one weeks should avoid riding mowers/tractors/intercourse/ejaculation/riding bicycles    Continue flomax  Continue cpap  Stand to urinate  Prevent constipation

## 2025-04-14 ENCOUNTER — TELEPHONE (OUTPATIENT)
Dept: PULMONOLOGY | Age: 60
End: 2025-04-14

## 2025-04-14 NOTE — TELEPHONE ENCOUNTER
Patient called in asking if you could send prednisone in for him. He stated that he is having some issues breathing, a cough that's not producing anything and no fever. Please advise thank you.

## 2025-04-16 RX ORDER — PREDNISONE 20 MG/1
40 TABLET ORAL DAILY
Qty: 20 TABLET | Refills: 0 | Status: SHIPPED | OUTPATIENT
Start: 2025-04-16 | End: 2025-04-26

## 2025-06-06 ENCOUNTER — TELEPHONE (OUTPATIENT)
Dept: PULMONOLOGY | Age: 60
End: 2025-06-06

## 2025-06-06 NOTE — TELEPHONE ENCOUNTER
Patient has not been seen in Pulmonary office in appx 4 months was noted to have SOB and wheezing present in February per previous office notation. Please contact patient and offer eval Monday or Tuesday to further evaluate. If patient is having SOB that is not relieved with rest and use of SHAWN \"rescue\" inhaler or O2 saturation is <89% recommend go to nearest ER/UC for further evaluation or calling 911.

## 2025-06-06 NOTE — TELEPHONE ENCOUNTER
Luis Carlos called in today he is wanting some prednisone. He has had a cough and some SOB over the last week. Please advise. Thank you

## 2025-07-15 ENCOUNTER — OFFICE VISIT (OUTPATIENT)
Dept: ENT CLINIC | Age: 60
End: 2025-07-15
Payer: MEDICAID

## 2025-07-15 VITALS
HEIGHT: 69 IN | SYSTOLIC BLOOD PRESSURE: 136 MMHG | DIASTOLIC BLOOD PRESSURE: 70 MMHG | WEIGHT: 234.6 LBS | RESPIRATION RATE: 18 BRPM | HEART RATE: 82 BPM | OXYGEN SATURATION: 95 % | TEMPERATURE: 98.2 F | BODY MASS INDEX: 34.75 KG/M2

## 2025-07-15 DIAGNOSIS — Z98.890 HISTORY OF NASAL SEPTOPLASTY: ICD-10-CM

## 2025-07-15 DIAGNOSIS — J34.89 NASAL OBSTRUCTION: Primary | ICD-10-CM

## 2025-07-15 PROCEDURE — 3075F SYST BP GE 130 - 139MM HG: CPT | Performed by: OTOLARYNGOLOGY

## 2025-07-15 PROCEDURE — 31231 NASAL ENDOSCOPY DX: CPT | Performed by: OTOLARYNGOLOGY

## 2025-07-15 PROCEDURE — 99203 OFFICE O/P NEW LOW 30 MIN: CPT | Performed by: OTOLARYNGOLOGY

## 2025-07-15 PROCEDURE — 3078F DIAST BP <80 MM HG: CPT | Performed by: OTOLARYNGOLOGY

## 2025-07-15 RX ORDER — DEXLANSOPRAZOLE 60 MG/1
CAPSULE, DELAYED RELEASE ORAL
COMMUNITY
Start: 2025-01-06

## 2025-07-15 RX ORDER — IPRATROPIUM BROMIDE 21 UG/1
2 SPRAY, METERED NASAL EVERY 12 HOURS
Qty: 30 ML | Refills: 1 | Status: SHIPPED | OUTPATIENT
Start: 2025-07-15

## 2025-07-15 NOTE — PROGRESS NOTES
capsule Take 1 capsule by mouth 3 times daily.      buPROPion (WELLBUTRIN XL) 300 MG extended release tablet Take 1 tablet by mouth every morning      buPROPion (WELLBUTRIN XL) 150 MG extended release tablet Take 1 tablet by mouth every evening      CPAP Machine MISC by Does not apply route Please change ramp to starting pressure 6 and 10 min ramp time 1 each 0    fluticasone (FLONASE) 50 MCG/ACT nasal spray 1 spray by Nasal route daily 3 Bottle 11    Respiratory Therapy Supplies (NEBULIZER COMPRESSOR) KIT by Does not apply route. 1 kit 0    Acetaminophen Extra Strength 500 MG TABS take 2 tablets by mouth every 6 hours if needed for pain (Patient not taking: Reported on 7/15/2025) 120 tablet 0    Handicap Placard MISC by Does not apply route Duration 5 years (Patient not taking: Reported on 7/15/2025) 1 each 0     No current facility-administered medications for this visit.     Past Medical History:   Diagnosis Date    Abdominal pain     Acute pharyngitis     Anxiety     Sanchez's esophagus     Chronic laryngitis     Constipation     COPD (chronic obstructive pulmonary disease) (HCC)     Depression     Fatigue     GERD (gastroesophageal reflux disease)     Hemorrhoids, internal, with bleeding     HEP C W/ COMA, ACUTE/NOS     Hyperlipidemia     Hypertension     IBS (irritable bowel syndrome)     Rectal bleeding     Shortness of breath     Weight loss       Past Surgical History:   Procedure Laterality Date    CARDIAC CATHETERIZATION  Dec 2012    CHOLECYSTECTOMY      COLONOSCOPY  02/16/2011    MANDIBLE FRACTURE SURGERY  1989    UPPER GASTROINTESTINAL ENDOSCOPY  12/14/2009    UPPER GASTROINTESTINAL ENDOSCOPY  9/25/13    sanchez's esophagus repeat 3 yr     Family History   Problem Relation Age of Onset    Heart Failure Mother     High Blood Pressure Mother     Heart Disease Mother     Diabetes Mother         had type 1 then it got better    Heart Surgery Father         CABG    Heart Disease Father     Other Brother

## 2025-07-29 ENCOUNTER — HOSPITAL ENCOUNTER (OUTPATIENT)
Dept: CT IMAGING | Age: 60
Discharge: HOME | End: 2025-07-29
Attending: OTOLARYNGOLOGY
Payer: MEDICAID

## 2025-07-29 DIAGNOSIS — Z98.890 HISTORY OF NASAL SEPTOPLASTY: ICD-10-CM

## 2025-07-29 PROCEDURE — 70486 CT MAXILLOFACIAL W/O DYE: CPT

## 2025-08-08 ENCOUNTER — OFFICE VISIT (OUTPATIENT)
Age: 60
End: 2025-08-08

## 2025-08-08 VITALS
HEIGHT: 69 IN | TEMPERATURE: 97.9 F | BODY MASS INDEX: 35.1 KG/M2 | HEART RATE: 118 BPM | DIASTOLIC BLOOD PRESSURE: 78 MMHG | WEIGHT: 237 LBS | SYSTOLIC BLOOD PRESSURE: 138 MMHG | OXYGEN SATURATION: 98 %

## 2025-08-08 DIAGNOSIS — F41.9 ANXIETY DISORDER WITH PANIC ATTACKS: ICD-10-CM

## 2025-08-08 DIAGNOSIS — J42 CHRONIC BRONCHITIS, UNSPECIFIED CHRONIC BRONCHITIS TYPE (HCC): ICD-10-CM

## 2025-08-08 DIAGNOSIS — R06.02 SOB (SHORTNESS OF BREATH): ICD-10-CM

## 2025-08-08 DIAGNOSIS — R09.89 CHRONIC SINUS COMPLAINTS: ICD-10-CM

## 2025-08-08 DIAGNOSIS — G47.33 OSA ON CPAP: Primary | ICD-10-CM

## 2025-08-20 ENCOUNTER — TELEPHONE (OUTPATIENT)
Dept: PULMONOLOGY | Age: 60
End: 2025-08-20

## 2025-08-20 DIAGNOSIS — J44.9 CHRONIC OBSTRUCTIVE PULMONARY DISEASE, UNSPECIFIED COPD TYPE (HCC): Primary | ICD-10-CM

## 2025-08-20 DIAGNOSIS — F12.90 MARIJUANA SMOKER, CONTINUOUS: ICD-10-CM
